# Patient Record
Sex: MALE | Race: WHITE | NOT HISPANIC OR LATINO | URBAN - METROPOLITAN AREA
[De-identification: names, ages, dates, MRNs, and addresses within clinical notes are randomized per-mention and may not be internally consistent; named-entity substitution may affect disease eponyms.]

---

## 2020-10-16 ENCOUNTER — OFFICE VISIT (OUTPATIENT)
Dept: INTERNAL MEDICINE | Facility: CLINIC | Age: 20
End: 2020-10-16

## 2020-10-16 ENCOUNTER — LAB (OUTPATIENT)
Dept: LAB | Facility: HOSPITAL | Age: 20
End: 2020-10-16

## 2020-10-16 VITALS
RESPIRATION RATE: 16 BRPM | WEIGHT: 223 LBS | DIASTOLIC BLOOD PRESSURE: 68 MMHG | HEART RATE: 63 BPM | OXYGEN SATURATION: 99 % | HEIGHT: 73 IN | SYSTOLIC BLOOD PRESSURE: 110 MMHG | BODY MASS INDEX: 29.55 KG/M2

## 2020-10-16 DIAGNOSIS — Z00.00 ANNUAL PHYSICAL EXAM: Primary | ICD-10-CM

## 2020-10-16 DIAGNOSIS — R30.0 DYSURIA: ICD-10-CM

## 2020-10-16 DIAGNOSIS — F31.9 BIPOLAR 1 DISORDER (HCC): ICD-10-CM

## 2020-10-16 LAB
ALBUMIN SERPL-MCNC: 4.6 G/DL (ref 3.5–5.2)
ALBUMIN/GLOB SERPL: 2.2 G/DL
ALP SERPL-CCNC: 70 U/L (ref 39–117)
ALT SERPL W P-5'-P-CCNC: 42 U/L (ref 1–41)
ANION GAP SERPL CALCULATED.3IONS-SCNC: 7.4 MMOL/L (ref 5–15)
AST SERPL-CCNC: 32 U/L (ref 1–40)
BASOPHILS # BLD AUTO: 0.08 10*3/MM3 (ref 0–0.2)
BASOPHILS NFR BLD AUTO: 0.9 % (ref 0–1.5)
BILIRUB BLD-MCNC: NEGATIVE MG/DL
BILIRUB SERPL-MCNC: 0.2 MG/DL (ref 0–1.2)
BUN SERPL-MCNC: 12 MG/DL (ref 6–20)
BUN/CREAT SERPL: 14.3 (ref 7–25)
CALCIUM SPEC-SCNC: 9.5 MG/DL (ref 8.6–10.5)
CHLORIDE SERPL-SCNC: 103 MMOL/L (ref 98–107)
CHOLEST SERPL-MCNC: 133 MG/DL (ref 0–200)
CLARITY, POC: CLEAR
CO2 SERPL-SCNC: 27.6 MMOL/L (ref 22–29)
COLOR UR: YELLOW
CREAT SERPL-MCNC: 0.84 MG/DL (ref 0.76–1.27)
DEPRECATED RDW RBC AUTO: 38.6 FL (ref 37–54)
EOSINOPHIL # BLD AUTO: 0.26 10*3/MM3 (ref 0–0.4)
EOSINOPHIL NFR BLD AUTO: 2.8 % (ref 0.3–6.2)
ERYTHROCYTE [DISTWIDTH] IN BLOOD BY AUTOMATED COUNT: 12.1 % (ref 12.3–15.4)
GFR SERPL CREATININE-BSD FRML MDRD: 114 ML/MIN/1.73
GLOBULIN UR ELPH-MCNC: 2.1 GM/DL
GLUCOSE SERPL-MCNC: 86 MG/DL (ref 65–99)
GLUCOSE UR STRIP-MCNC: NEGATIVE MG/DL
HBA1C MFR BLD: 5.4 % (ref 4.8–5.6)
HCT VFR BLD AUTO: 44.8 % (ref 37.5–51)
HDLC SERPL-MCNC: 38 MG/DL (ref 40–60)
HGB BLD-MCNC: 15.4 G/DL (ref 13–17.7)
IMM GRANULOCYTES # BLD AUTO: 0.02 10*3/MM3 (ref 0–0.05)
IMM GRANULOCYTES NFR BLD AUTO: 0.2 % (ref 0–0.5)
KETONES UR QL: NEGATIVE
LDLC SERPL CALC-MCNC: 62 MG/DL (ref 0–100)
LDLC/HDLC SERPL: 1.46 {RATIO}
LEUKOCYTE EST, POC: NEGATIVE
LYMPHOCYTES # BLD AUTO: 2.58 10*3/MM3 (ref 0.7–3.1)
LYMPHOCYTES NFR BLD AUTO: 27.6 % (ref 19.6–45.3)
MCH RBC QN AUTO: 30.1 PG (ref 26.6–33)
MCHC RBC AUTO-ENTMCNC: 34.4 G/DL (ref 31.5–35.7)
MCV RBC AUTO: 87.5 FL (ref 79–97)
MONOCYTES # BLD AUTO: 0.82 10*3/MM3 (ref 0.1–0.9)
MONOCYTES NFR BLD AUTO: 8.8 % (ref 5–12)
NEUTROPHILS NFR BLD AUTO: 5.59 10*3/MM3 (ref 1.7–7)
NEUTROPHILS NFR BLD AUTO: 59.7 % (ref 42.7–76)
NITRITE UR-MCNC: NEGATIVE MG/ML
NRBC BLD AUTO-RTO: 0.1 /100 WBC (ref 0–0.2)
PH UR: 8 [PH] (ref 5–8)
PLATELET # BLD AUTO: 337 10*3/MM3 (ref 140–450)
PMV BLD AUTO: 10.4 FL (ref 6–12)
POTASSIUM SERPL-SCNC: 4.2 MMOL/L (ref 3.5–5.2)
PROT SERPL-MCNC: 6.7 G/DL (ref 6–8.5)
PROT UR STRIP-MCNC: NEGATIVE MG/DL
RBC # BLD AUTO: 5.12 10*6/MM3 (ref 4.14–5.8)
RBC # UR STRIP: ABNORMAL /UL
SODIUM SERPL-SCNC: 138 MMOL/L (ref 136–145)
SP GR UR: 1.01 (ref 1–1.03)
TRIGL SERPL-MCNC: 197 MG/DL (ref 0–150)
TSH SERPL DL<=0.05 MIU/L-ACNC: 1.3 UIU/ML (ref 0.27–4.2)
UROBILINOGEN UR QL: NORMAL
VLDLC SERPL-MCNC: 33 MG/DL (ref 5–40)
WBC # BLD AUTO: 9.35 10*3/MM3 (ref 3.4–10.8)

## 2020-10-16 PROCEDURE — 99213 OFFICE O/P EST LOW 20 MIN: CPT | Performed by: INTERNAL MEDICINE

## 2020-10-16 PROCEDURE — 82306 VITAMIN D 25 HYDROXY: CPT | Performed by: INTERNAL MEDICINE

## 2020-10-16 PROCEDURE — 80061 LIPID PANEL: CPT | Performed by: INTERNAL MEDICINE

## 2020-10-16 PROCEDURE — 85025 COMPLETE CBC W/AUTO DIFF WBC: CPT | Performed by: INTERNAL MEDICINE

## 2020-10-16 PROCEDURE — G0432 EIA HIV-1/HIV-2 SCREEN: HCPCS | Performed by: INTERNAL MEDICINE

## 2020-10-16 PROCEDURE — 80053 COMPREHEN METABOLIC PANEL: CPT | Performed by: INTERNAL MEDICINE

## 2020-10-16 PROCEDURE — 99385 PREV VISIT NEW AGE 18-39: CPT | Performed by: INTERNAL MEDICINE

## 2020-10-16 PROCEDURE — 83036 HEMOGLOBIN GLYCOSYLATED A1C: CPT | Performed by: INTERNAL MEDICINE

## 2020-10-16 PROCEDURE — 84443 ASSAY THYROID STIM HORMONE: CPT | Performed by: INTERNAL MEDICINE

## 2020-10-16 PROCEDURE — 86592 SYPHILIS TEST NON-TREP QUAL: CPT | Performed by: INTERNAL MEDICINE

## 2020-10-16 PROCEDURE — 81003 URINALYSIS AUTO W/O SCOPE: CPT | Performed by: INTERNAL MEDICINE

## 2020-10-16 PROCEDURE — 86803 HEPATITIS C AB TEST: CPT | Performed by: INTERNAL MEDICINE

## 2020-10-16 PROCEDURE — 82607 VITAMIN B-12: CPT | Performed by: INTERNAL MEDICINE

## 2020-10-16 RX ORDER — CYCLOBENZAPRINE HCL 10 MG
10 TABLET ORAL 3 TIMES DAILY PRN
COMMUNITY
End: 2022-10-24

## 2020-10-16 RX ORDER — ACYCLOVIR 400 MG/1
400 TABLET ORAL
COMMUNITY
End: 2023-03-06

## 2020-10-16 RX ORDER — NAPROXEN 250 MG/1
250 TABLET ORAL 2 TIMES DAILY PRN
COMMUNITY
End: 2023-01-16

## 2020-10-16 NOTE — PROGRESS NOTES
Office Note      Date: 10/16/2020  Patient Name: Artur Dias  MRN: [id number]  : 2000    Chief Complaint   Patient presents with   • Urinary Tract Infection   • Establish Care       History of Present Illness: Artur Dias is a 20 y.o. male who presents for Urinary Tract Infection and Establish Care. He would like a checkup. 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 Hx of drug abuse, including IV drug use.     Has had dysuria for the past few months. Has seen urology as a teen for testicular cyst. No penile discharge. No fevers. No abd pain. Also has polyuria.     Hx of bipolar d/o on multiple meds but stopped on his own after he was 16. Feels well w/o any medications.     Has seen [facility] reece this year for DDD. Given yearlong rx for naproxen and also has some flexeril left over.     Subjective      Review of Systems:   Pertinent review of systems per HPI.    Review of Systems   Constitutional: Negative for activity change, appetite change, chills, diaphoresis and fatigue.   HENT: Negative for congestion, dental problem, drooling, ear discharge, ear pain, facial swelling and hearing loss.    Eyes: Negative for photophobia, pain, discharge, redness, itching and visual disturbance.   Respiratory: Negative for cough, choking, chest tightness and shortness of breath.    Cardiovascular: Negative for chest pain, palpitations and leg swelling.   Endocrine: Negative for cold intolerance, heat intolerance, polydipsia and polyuria.   Genitourinary: Positive for dysuria. Negative for difficulty urinating, flank pain, frequency, hematuria and testicular pain.   Musculoskeletal: Positive for back pain. Negative for arthralgias.   Skin: Negative for color change, pallor, rash and wound.   Allergic/Immunologic: Negative for environmental allergies.   Neurological: Negative for dizziness, facial asymmetry, light-headedness and headaches.   Psychiatric/Behavioral: Negative.    All  "other systems reviewed and are negative.    No Known Allergies    Objective     Physical Exam:  Vital Signs:   Vitals:    10/16/20 1325   BP: 110/68   Pulse: 63   Resp: 16   SpO2: 99%   Weight: 101 kg (223 lb)   Height: 185.4 cm (73\")       Physical Exam  Vitals signs and nursing note reviewed.   Constitutional:       General: He is not in acute distress.     Appearance: He is well-developed.   HENT:      Head: Normocephalic and atraumatic.      Right Ear: External ear normal.      Left Ear: External ear normal.   Eyes:      Extraocular Movements: Extraocular movements intact.      Conjunctiva/sclera: Conjunctivae normal.      Pupils: Pupils are equal, round, and reactive to light.   Cardiovascular:      Rate and Rhythm: Normal rate and regular rhythm.      Heart sounds: Normal heart sounds. No murmur. No friction rub. No gallop.    Pulmonary:      Effort: Pulmonary effort is normal. No respiratory distress.      Breath sounds: Normal breath sounds. No wheezing or rales.   Abdominal:      General: Abdomen is flat. Bowel sounds are normal.      Palpations: Abdomen is soft.   Skin:     General: Skin is warm and dry.      Coloration: Skin is not pale.         Assessment / Plan      Assessment & Plan:    1. Annual physical exam  Counseled on:  Colonoscopy at 45-51 y/o  PNA vaccinations starting at age 65  shingrix at age 50  Td/Tdap q 10 years  Wear seatbelt when driving  Flu shot annually- refused flu shot today    - Comprehensive Metabolic Panel  - Lipid Panel  - CBC & Differential  - TSH Rfx On Abnormal To Free T4  - Vitamin B12  - Vitamin D 25 Hydroxy  - CBC Auto Differential    2. Dysuria  UA dipstick neg. Has some blood. Will check for STDs.   - POCT urinalysis dipstick, automated  - Hepatitis C Antibody  - HIV-1 / O / 2 Ag / Antibody 4th Generation  - RPR  - Hemoglobin A1c    3. Bipolar 1 disorder (CMS/HCC)    - Ambulatory Referral to Psychiatry      Kerry Simmons MD  10/16/2020     Please note that portions of " this note may have been completed with a voice recognition program. Efforts were made to edit the dictations, but occasionally words are mistranscribed.

## 2020-10-17 LAB
25(OH)D3 SERPL-MCNC: 30.1 NG/ML (ref 30–100)
HCV AB SER DONR QL: NORMAL
HIV1+2 AB SER QL: NORMAL
RPR SER QL: NORMAL
VIT B12 BLD-MCNC: 385 PG/ML (ref 211–946)

## 2022-09-19 ENCOUNTER — LAB (OUTPATIENT)
Dept: LAB | Facility: HOSPITAL | Age: 22
End: 2022-09-19

## 2022-09-19 ENCOUNTER — OFFICE VISIT (OUTPATIENT)
Dept: INTERNAL MEDICINE | Facility: CLINIC | Age: 22
End: 2022-09-19

## 2022-09-19 VITALS
BODY MASS INDEX: 27.83 KG/M2 | WEIGHT: 210 LBS | TEMPERATURE: 98.3 F | HEART RATE: 87 BPM | SYSTOLIC BLOOD PRESSURE: 130 MMHG | DIASTOLIC BLOOD PRESSURE: 80 MMHG | OXYGEN SATURATION: 98 % | HEIGHT: 73 IN | RESPIRATION RATE: 16 BRPM

## 2022-09-19 DIAGNOSIS — F31.9 BIPOLAR 1 DISORDER: ICD-10-CM

## 2022-09-19 DIAGNOSIS — M62.81 MUSCLE WEAKNESS: ICD-10-CM

## 2022-09-19 DIAGNOSIS — R53.83 FATIGUE, UNSPECIFIED TYPE: Primary | ICD-10-CM

## 2022-09-19 DIAGNOSIS — Z91.048 ALLERGY TO MOLD: ICD-10-CM

## 2022-09-19 PROCEDURE — 82607 VITAMIN B-12: CPT | Performed by: INTERNAL MEDICINE

## 2022-09-19 PROCEDURE — 99214 OFFICE O/P EST MOD 30 MIN: CPT | Performed by: INTERNAL MEDICINE

## 2022-09-19 PROCEDURE — 80050 GENERAL HEALTH PANEL: CPT | Performed by: INTERNAL MEDICINE

## 2022-09-19 RX ORDER — NICOTINE 14MG/24HR
1 PATCH, TRANSDERMAL 24 HOURS TRANSDERMAL DAILY
COMMUNITY
Start: 2022-09-08 | End: 2022-10-24

## 2022-09-19 RX ORDER — FLUTICASONE PROPIONATE 50 MCG
2 SPRAY, SUSPENSION (ML) NASAL DAILY
COMMUNITY
Start: 2022-09-08 | End: 2022-10-24

## 2022-09-20 LAB
ALBUMIN SERPL-MCNC: 4.8 G/DL (ref 3.5–5.2)
ALBUMIN/GLOB SERPL: 2.2 G/DL
ALP SERPL-CCNC: 71 U/L (ref 39–117)
ALT SERPL W P-5'-P-CCNC: 28 U/L (ref 1–41)
ANION GAP SERPL CALCULATED.3IONS-SCNC: 11.8 MMOL/L (ref 5–15)
AST SERPL-CCNC: 18 U/L (ref 1–40)
BILIRUB SERPL-MCNC: 0.2 MG/DL (ref 0–1.2)
BUN SERPL-MCNC: 10 MG/DL (ref 6–20)
BUN/CREAT SERPL: 12.7 (ref 7–25)
CALCIUM SPEC-SCNC: 10 MG/DL (ref 8.6–10.5)
CHLORIDE SERPL-SCNC: 101 MMOL/L (ref 98–107)
CO2 SERPL-SCNC: 28.2 MMOL/L (ref 22–29)
CREAT SERPL-MCNC: 0.79 MG/DL (ref 0.76–1.27)
DEPRECATED RDW RBC AUTO: 39.2 FL (ref 37–54)
EGFRCR SERPLBLD CKD-EPI 2021: 127.2 ML/MIN/1.73
ERYTHROCYTE [DISTWIDTH] IN BLOOD BY AUTOMATED COUNT: 12.5 % (ref 12.3–15.4)
GLOBULIN UR ELPH-MCNC: 2.2 GM/DL
GLUCOSE SERPL-MCNC: 79 MG/DL (ref 65–99)
HCT VFR BLD AUTO: 43.8 % (ref 37.5–51)
HGB BLD-MCNC: 15.1 G/DL (ref 13–17.7)
MCH RBC QN AUTO: 29.3 PG (ref 26.6–33)
MCHC RBC AUTO-ENTMCNC: 34.5 G/DL (ref 31.5–35.7)
MCV RBC AUTO: 84.9 FL (ref 79–97)
PLATELET # BLD AUTO: 313 10*3/MM3 (ref 140–450)
PMV BLD AUTO: 10.3 FL (ref 6–12)
POTASSIUM SERPL-SCNC: 4.2 MMOL/L (ref 3.5–5.2)
PROT SERPL-MCNC: 7 G/DL (ref 6–8.5)
RBC # BLD AUTO: 5.16 10*6/MM3 (ref 4.14–5.8)
SODIUM SERPL-SCNC: 141 MMOL/L (ref 136–145)
TSH SERPL DL<=0.05 MIU/L-ACNC: 1 UIU/ML (ref 0.27–4.2)
VIT B12 BLD-MCNC: 382 PG/ML (ref 211–946)
WBC NRBC COR # BLD: 13.21 10*3/MM3 (ref 3.4–10.8)

## 2022-09-21 NOTE — PROGRESS NOTES
Office Note      Date: 2022  Patient Name: Artur Dias  MRN: [id number]  : 2000    Chief Complaint   Patient presents with   • Fatigue   • Uncontrollable Muscle Weakness   • Sinusitis       History of Present Illness: Artur Dias is a 22 y.o. male who presents for Fatigue, Uncontrollable Muscle Weakness, and Sinusitis. Sx chronic. Has been seen in the ED for CP with neg work up. Has b/l LE weakness. Has sinus pressure. Would like allergy testing to mold. Hx of bipolar d/o but stopped taking meds on his own. Has some depression sx. Has stress from children.     Subjective      Review of Systems:   Pertinent review of systems per HPI.    Review of Systems   Constitutional: Positive for fatigue. Negative for activity change, appetite change, chills and diaphoresis.   HENT: Negative for congestion, dental problem, drooling, ear discharge, ear pain, facial swelling and hearing loss.    Eyes: Negative for photophobia, pain, discharge, redness, itching and visual disturbance.   Respiratory: Negative for cough, choking, chest tightness and shortness of breath.    Cardiovascular: Negative for chest pain, palpitations and leg swelling.   Endocrine: Negative for cold intolerance, heat intolerance, polydipsia and polyuria.   Genitourinary: Negative for difficulty urinating, dysuria, flank pain, frequency and hematuria.   Musculoskeletal: Negative for arthralgias and back pain.   Skin: Negative for color change, pallor, rash and wound.   Allergic/Immunologic: Negative for environmental allergies.   Neurological: Positive for weakness. Negative for dizziness, facial asymmetry, light-headedness and headaches.   Psychiatric/Behavioral: Negative.    All other systems reviewed and are negative.    No Known Allergies    Objective     Physical Exam:  Vital Signs:   Vitals:    22 1539   BP: 130/80   Pulse: 87   Resp: 16   Temp: 98.3 °F (36.8 °C)   TempSrc: Temporal   SpO2: 98%   Weight: 95.3 kg (210 lb)  "  Height: 185.4 cm (73\")      Body mass index is 27.71 kg/m².    Physical Exam  Vitals and nursing note reviewed.   Constitutional:       General: He is not in acute distress.     Appearance: He is well-developed.   HENT:      Head: Normocephalic and atraumatic.      Right Ear: External ear normal.      Left Ear: External ear normal.   Eyes:      General: No scleral icterus.        Right eye: No discharge.         Left eye: No discharge.      Conjunctiva/sclera: Conjunctivae normal.   Cardiovascular:      Rate and Rhythm: Normal rate and regular rhythm.      Heart sounds: Normal heart sounds. No murmur heard.    No friction rub. No gallop.   Pulmonary:      Effort: Pulmonary effort is normal. No respiratory distress.      Breath sounds: Normal breath sounds. No wheezing or rales.   Skin:     General: Skin is warm and dry.      Coloration: Skin is not pale.         Assessment / Plan      Assessment & Plan:    1. Fatigue, unspecified type    - CBC (No Diff)  - Comprehensive Metabolic Panel  - TSH Rfx On Abnormal To Free T4  - Vitamin B12    2. Bipolar 1 disorder (HCC)    - Ambulatory Referral to Psychiatry    3. Allergy to mold    - Ambulatory Referral to Allergy    4. Muscle weakness    - Ambulatory Referral to Physical Therapy    Check labs above, if normal advised to f/u with psych for tx of underlying mood d/o as this could be cause of all his sx.   Ref to allergy for mold testing and PT for physical strenthening    Kerry Guzman MD  09/19/2022   Answers for HPI/ROS submitted by the patient on 9/15/2022  What is the primary reason for your visit?: Other  Please describe your symptoms.: Muscle weakness, Fatigue, Weight loss, joint pain  Have you had these symptoms before?: Yes  How long have you been having these symptoms?: 1-2 weeks      "

## 2022-10-05 ENCOUNTER — TREATMENT (OUTPATIENT)
Dept: PHYSICAL THERAPY | Facility: CLINIC | Age: 22
End: 2022-10-05

## 2022-10-05 DIAGNOSIS — R29.898 BILATERAL ARM WEAKNESS: Primary | ICD-10-CM

## 2022-10-05 PROCEDURE — 97162 PT EVAL MOD COMPLEX 30 MIN: CPT | Performed by: PHYSICAL THERAPIST

## 2022-10-05 PROCEDURE — 97110 THERAPEUTIC EXERCISES: CPT | Performed by: PHYSICAL THERAPIST

## 2022-10-05 NOTE — PROGRESS NOTES
Physical Therapy Initial Evaluation and Plan of Care      Patient: Artur Dias   : 2000  Diagnosis/ICD-10 Code:  The encounter diagnosis was Bilateral arm weakness.   Referring practitioner: Kerry Guzman MD  Date of Initial Visit: Type: THERAPY  Noted: 10/5/2022  Today's Date: 10/5/2022  Patient seen for 1 sessions         Visit Diagnoses:    ICD-10-CM ICD-9-CM   1. Bilateral arm weakness  R29.898 729.89       Subjective Questionnaire: QuickDASH: 27.27    Subjective Evaluation    History of Present Illness  Onset date: hands chronic for about 4 years.  Mechanism of injury: Symptoms of weakness began about 1 month ago.  Pt says he thinks he may have had a virus because many of his previous symptoms have resolved.  His arms are not as bad as they were.  He reports a 3-4 year history of B hand symptoms.  He wakes with tingling, numbness in first four fingers of R hand more than L.  It feels like fingers are locked up in the mornings. 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  Worked at [employer] until 4 months ago when he began work at [employer].     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 Pain  Current pain ratin  At best pain ratin  At worst pain ratin  Location: B hands, first 4 digits  Quality: sharp and cramping (numbness, tingling)  Alleviating factors: goes away on its own or with movement.  Exacerbated by: fine motor activities, bolts, etc.  Progression: worsening    Hand dominance: right    Diagnostic Tests  No diagnostic tests performed    Treatments  No previous or current treatments  Patient Goals  Patient goals for therapy: decreased pain           Treatment  Exercise 1  Exercise Name 1: HEP  education and practice         Functional Testing  Functional Tests Options: Quick DASH   Objective          Postural Observations    Additional Postural Observation Details  FHP, protracted B scapulae.      Palpation     Additional Palpation Details  Tenderness and tightness in R thenar eminence.    Neurological Testing     Sensation   Cervical/Thoracic   Left   Intact: light touch    Right   Intact: light touch    Additional Neurological Details  Intermittent numbness/tingling in first 4 fingers of R hand more than left.    Active Range of Motion     Additional Active Range of Motion Details  No change in hand/UE symptoms with CROM with OP.  CROM grossly WNL's.    Strength/Myotome Testing   Cervical Spine     Left   Normal strength    Right   Normal strength    Left Wrist/Hand      (2nd hand position)     Trial 1: 120 lbs    Trial 2: 120 lbs    Trial 3: 110 lbs    Average: 116.67 lbs    Right Wrist/Hand      (2nd hand position)     Trial 1: 119 lbs    Trial 2: 101 lbs    Trial 3: 130 lbs    Average: 116.67 lbs    Tests   Cervical     Left   Negative active compression ([place]).     Left Wrist/Hand   Positive Phalen's sign.   Negative Tinel's sign (medial nerve).     Right Wrist/Hand   Positive Phalen's sign and Tinel's sign (medial nerve).           Assessment & Plan     Assessment  Impairments: lacks appropriate home exercise program and pain with function  Functional Limitations: uncomfortable because of pain  Assessment details: Clinical presentation suggests R CTS more pronounced than L.  Pt will benefit from PT intervention to address pain and noted deficits and to educate in preventive/management strategies.    Barriers to therapy:   Prognosis: good    Goals  Plan Goals: 4 weeks  1)Pt. demonstrates independence and compliance with initial HEP.  2)Pt. reports reduction in pain intensity to no worse than 3/10 on NPRS.    6 weeks  1)Pt. demonstrates independence in advanced HEP for  ongoing improvement/symptom management.  2)Pt reports reduction in symptom frequency and intensity, and relief with performance of prescribed exercises.  3)Functional outcome measure is improved to <20 points, indicating improving functional abilities.          Plan  Therapy options: will be seen for skilled therapy services  Planned modality interventions: thermotherapy (hydrocollator packs) and cryotherapy  Planned therapy interventions: manual therapy, postural training, soft tissue mobilization, strengthening, stretching, therapeutic activities, joint mobilization, home exercise program, functional ROM exercises, flexibility and body mechanics training  Frequency: 1x week  Duration in weeks: 6  Treatment plan discussed with: patient  Plan details: PT weekly for 6 weeks, addressing pain and noted functional deficits.        Timed:  Manual Therapy:         mins  14992;  Therapeutic Exercise:    10     mins  80890;     Neuromuscular Charisma:        mins  28062;    Therapeutic Activity:          mins  70814;     Gait Training:           mins  49986;     Ultrasound:          mins  24516;    Electrical Stimulation:         mins  07364 ( );  Iontophoresis  ___ mins   96891    Untimed:  Electrical Stimulation:         mins  46732 ( );  Mechanical Traction:         mins  00647;     Timed Treatment:   10   mins   Total Treatment:     35   mins    PT SIGNATURE: Rebeca Bonilla PT   Electronically signed  DATE TREATMENT INITIATED: 10/5/2022    Initial Certification  Certification Period: 10/5/7965xokv5/2/2023  I certify that the therapy services are furnished while this patient is under my care.  The services outlined above are required by this patient, and will be reviewed every 90 days.    Physician Signature:_____________________________________________             PHYSICIAN: Kerry Guzman MD  NPI: [id number]                                      DATE:       Please sign and return via fax to  [phone].. Thank you, [facility].

## 2022-10-10 ENCOUNTER — TREATMENT (OUTPATIENT)
Dept: PHYSICAL THERAPY | Facility: CLINIC | Age: 22
End: 2022-10-10

## 2022-10-10 DIAGNOSIS — R29.898 BILATERAL ARM WEAKNESS: Primary | ICD-10-CM

## 2022-10-10 PROCEDURE — 97110 THERAPEUTIC EXERCISES: CPT | Performed by: PHYSICAL THERAPIST

## 2022-10-10 PROCEDURE — 97140 MANUAL THERAPY 1/> REGIONS: CPT | Performed by: PHYSICAL THERAPIST

## 2022-10-10 NOTE — PROGRESS NOTES
Physical Therapy Treatment Note  VISIT: 2          Subjective    Artur Dias reports: he woke up with less intense symptoms this morning.  He has done his home exercises, but he says not consistently.  He indicates tingling in fingertips of R 1-3 digits, but not in thumb or palm today.      Pre-treatment pain:  5  Post-treatment pain:  4      Objective    Treatment    Exercise 1  Exercise Name 1: R hand tendon glides  Exercise 2  Exercise Name 2: R forearm stretches-flexors and extensors  Exercise 3  Exercise Name 3: radial nerve glides  Exercise 4  Exercise Name 4: median nerve glides  Exercise 5  Exercise Name 5: chin tucks  Exercise 6  Exercise Name 6: cervicothoracic extension-seated  Exercise 7  Exercise Name 7: fist traction    Manual Rx 1  Manual Rx 1 Location: R wrist and hand  Manual Rx 1 Type: Astym, STM, PROM, and joint mobilization to improve R wrist and hand flexibility    Other Treatment Provided  MH Applied: Yes  MH Prior to Rx: Yes       Assessment & Plan     Assessment    Assessment details: Symptom presentation today suggests cervical component of R UE symptoms.  Pt noted awareness of L hand symptoms while working only with R hand in clinic today.  Cervical flexion reduced symptoms while extension seemed to increase symptoms.    Plan  Plan details: Continue PT.  Progress or modify exercises based on symptom response.               Timed:  Manual Therapy:    15     mins  95716;  Therapeutic Exercise:    25     mins  38357;     Neuromuscular Charisma:        mins  22816;    Therapeutic Activity:          mins  35000;     Gait Training:           mins  79556;     Ultrasound:          mins  87119;    Electrical Stimulation:         mins  73521 ( );    Untimed:  Electrical Stimulation:         mins  19834 ( );  Mechanical Traction:         mins  62757;  Canalith Repositioning       mins  07950    Timed Treatment:   40   mins   Total Treatment:     50   mins      Rebeca EPSTEIN  Chad, PT  Physical Therapist

## 2022-10-17 ENCOUNTER — TELEPHONE (OUTPATIENT)
Dept: PHYSICAL THERAPY | Facility: CLINIC | Age: 22
End: 2022-10-17

## 2022-10-24 ENCOUNTER — TELEMEDICINE (OUTPATIENT)
Dept: PSYCHIATRY | Facility: CLINIC | Age: 22
End: 2022-10-24

## 2022-10-24 DIAGNOSIS — F41.1 GENERALIZED ANXIETY DISORDER: ICD-10-CM

## 2022-10-24 DIAGNOSIS — F31.89 OTHER BIPOLAR DISORDER: Primary | ICD-10-CM

## 2022-10-24 DIAGNOSIS — F40.10 SOCIAL ANXIETY DISORDER: ICD-10-CM

## 2022-10-24 PROCEDURE — 90792 PSYCH DIAG EVAL W/MED SRVCS: CPT

## 2022-10-24 RX ORDER — BUSPIRONE HYDROCHLORIDE 10 MG/1
10 TABLET ORAL 3 TIMES DAILY
Qty: 90 TABLET | Refills: 0 | Status: SHIPPED | OUTPATIENT
Start: 2022-10-24 | End: 2022-11-21 | Stop reason: SDUPTHER

## 2022-10-24 RX ORDER — PROPRANOLOL HYDROCHLORIDE 10 MG/1
10 TABLET ORAL 2 TIMES DAILY PRN
Qty: 60 TABLET | Refills: 0 | Status: SHIPPED | OUTPATIENT
Start: 2022-10-24 | End: 2022-11-21

## 2022-10-24 RX ORDER — LAMOTRIGINE 25 MG/1
TABLET ORAL
Qty: 46 TABLET | Refills: 0 | Status: SHIPPED | OUTPATIENT
Start: 2022-10-24 | End: 2022-11-21

## 2022-10-24 NOTE — PROGRESS NOTES
This provider is located at [place], [place]. The Patient is seen remotely using Video. Patient is being seen via telehealth and confirm that they are in a secure environment for this session. Patient is located in [place], [place] at his home. The patient's condition being diagnosed/treated is appropriate for telemedicine. Provider identified as Michael Nicholas as well as credentials APRN MSN PMHNP-BC.   The client/patient gave consent to be seen remotely, and when consent is given they understand that the consent allows for patient identifiable information to be sent to a third party as needed.  They may refuse to be seen remotely at any time. The electronic data is encrypted and password protected, and the patient has been advised of the potential risks to privacy not withstanding such measures.    Subjective     Artur Dias is a 22 y.o. male who presents today for initial evaluation     Chief Complaint: Bipolar disorder and anxiety    History of Present Illness: This is the first encounter for this APRN with the patient.  Patient is a referral from his primary care physician for evaluation and management of bipolar disorder and anxiety.  Patient reports he was diagnosed with bipolar disorder in the past.  He states he first started treatment at age 12.  States at that time he was having a lot of anger, irritability, depression, and anxiety.  He states he has had 2 inpatient admissions at age 13.  Did have 1 overdose attempt of taking muscle relaxers.  He states he has been on and off medications through his life.  States he is not been on any medications for the past 5+ years.  He states he would like to get his symptoms more under control.  States he has children now and that is also giving him problems in his relationship.  He states he does experience episodes of depression.  He describes these as loss of interest in doing things that he enjoys, and lack of motivation.  He states he is a hard worker at his  job, but when it comes to things at home he just has no motivation to get those done.  He currently rates his depression a 5 on a 1-10 scale with 10 being the worst.  States sleep is poor.  Appetite is good.  He denies any hopelessness.  Denies any suicidal or homicidal ideation.  States he does have a history of cutting, but has not done that for years.  Patient was asked about any history of any hypomanic type symptoms.  He states he does go for about a day at a time couple times a month where he has increased energy, is more productive, thoughts will race, and is more impulsive.  He states he cannot recall them ever lasting more than a day.  Also states he has a lot of irritability during these times.  He states the simplest thing will make him angry.  He denies any history of auditory or visual hallucinations.  Denies any paranoia.  States he also suffers from anxiety.  Rates his anxiety as 7 on a 1-10 scale with 10 being the worst.  States he is a worrier and over thinker.  He states when he was in a juvenile longterm center for 1-1/2 years that he was on Prozac and Lexapro.  He states during those times he was more irritable and angry.  Discussed with patient that those could contribute to any hypomania that he was experiencing.  He states he has had panic attacks in the past, but none in the last 2 months.  Also has a lot of social anxiety.  He states his fiancée often wants him to go to places with her, but he cannot due to his anxiety.  He describes that he has increased heart rate, shortness of breath, shaky voice, and tension in the situations.  He states crowds also cause these issues.  He states when he worked at [employer] he was a  and had to address his team, and he always felt the symptoms of anxiety when doing so.    The following portions of the patient's history were reviewed and updated as appropriate: allergies, current medications, past family history, past medical history, past  social history, past surgical history and problem list.    Past Psychiatric History: See HPI for treatment history.    Family Psychiatric History: Mother and maternal uncle have drug abuse issues.  He does not know his biological father, so details of that side of the family is not known.  No suicides among first-degree relatives.    Substance Use History: Patient stated that he started using pills and cocaine at age 10.  He smoked marijuana from age [age].  Also has used cocaine, crack, and methamphetamine in the past.  States he has not used any of those drugs since May 30, 2018.  States he drinks alcohol occasionally.    Past Medical History:  Past Medical History:   Diagnosis Date   • Arthritis    • Bipolar 1 disorder (HCC)    • DDD (degenerative disc disease), lumbar    • HSV (herpes simplex virus) anogenital infection        Social History: Patient was born and raised in [place].  He has lived in [place] for about the last 5-1/2 years for a better opportunity for his future.  He states there were a lot of drugs where he grew up from anyone to get away from that.  He was raised by his mother to age 12.  At age 12 he was living with his grandfather's ex-wife.  He states she left and he basically raised himself from age [age].  States he lived with friends and bounced from house to house.  He has a GED.  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  He has been engaged for the past 3 years.  They plan to get  next year.  He has a 8-year-old daughter that still lives in [place] with her mother, and a 2-year-old son with his current fiancé.  He was in a juvenile facility for 1-1/2 years.  Denies any current legal issues.  Hobbies include video games and fishing.  Social History     Socioeconomic History   • Marital status: Single   Tobacco Use   • Smoking status: Every Day     Packs/day: 1.50     Types: Cigarettes   •  Smokeless tobacco: Never   Substance and Sexual Activity   • Alcohol use: Yes     Comment: rarely   • Drug use: Never   • Sexual activity: Defer       Family History:  Family History   Problem Relation Age of Onset   • Liver disease Mother    • Mental illness Mother    • Diabetes Maternal Grandfather        Past Surgical History:  Past Surgical History:   Procedure Laterality Date   • CHOLECYSTECTOMY  2019       Problem List:  Patient Active Problem List   Diagnosis   • Other bipolar disorder (HCC)   • Generalized anxiety disorder       Allergy:   No Known Allergies     Current Medications:   Current Outpatient Medications   Medication Sig Dispense Refill   • acyclovir (ZOVIRAX) 400 MG tablet Take 400 mg by mouth Every 4 (Four) Hours While Awake. Take no more than 5 doses a day.     • busPIRone (BUSPAR) 10 MG tablet Take 1 tablet by mouth 3 (Three) Times a Day. 90 tablet 0   • lamoTRIgine (LaMICtal) 25 MG tablet Take 1 tablet by mouth Daily for 14 days, THEN 2 tablets Daily for 16 days. 46 tablet 0   • naproxen (NAPROSYN) 250 MG tablet Take 250 mg by mouth 2 (Two) Times a Day As Needed.     • propranolol (INDERAL) 10 MG tablet Take 1 tablet by mouth 2 (Two) Times a Day As Needed (anxiety). 60 tablet 0     No current facility-administered medications for this visit.       Review of Symptoms:    Review of Systems   Constitutional: Negative.    Musculoskeletal: Positive for back pain.        DDD   Psychiatric/Behavioral: Positive for sleep disturbance and depressed mood. The patient is nervous/anxious.          Physical Exam:   There were no vitals taken for this visit.    Appearance: Normal  Gait, Station, Strength: Within normal limits    Mental Status Exam:   Hygiene:   good  Cooperation:  Cooperative  Eye Contact:  Good  Psychomotor Behavior:  Appropriate  Affect:  Full range  Mood: anxious  Hopelessness: Denies  Speech:  Normal  Thought Process:  Goal directed  Thought Content:  Normal  Suicidal:  None  Homicidal:   None  Hallucinations:  None  Delusion:  None  Memory:  Intact  Orientation:  Person, Place, Time and Situation  Reliability:  good  Insight:  Good  Judgement:  Good  Impulse Control:  Good    PHQ-9 Depression Screening  Little interest or pleasure in doing things?     Feeling down, depressed, or hopeless?     Trouble falling or staying asleep, or sleeping too much?     Feeling tired or having little energy?     Poor appetite or overeating?     Feeling bad about yourself - or that you are a failure or have let yourself or your family down?     Trouble concentrating on things, such as reading the newspaper or watching television?     Moving or speaking so slowly that other people could have noticed? Or the opposite - being so fidgety or restless that you have been moving around a lot more than usual?     Thoughts that you would be better off dead, or of hurting yourself in some way?     PHQ-9 Total Score     If you checked off any problems, how difficult have these problems made it for you to do your work, take care of things at home, or get along with other people?          PHQ-9 Total Score:       DORITA 7 anxiety screening tool that patient filled out virtually reviewed by this APRN at today's encounter.    PROMIS scale screening tool that patient filled out virtually reviewed by this APRN at today's encounter.    [facility] request number [id number] reviewed by this APRN at today's encounter.    Previous Provider notes and available records reviewed by this APRN today.     Lab Results:   Office Visit on 09/19/2022   Component Date Value Ref Range Status   • WBC 09/19/2022 13.21 (H)  3.40 - 10.80 10*3/mm3 Final   • RBC 09/19/2022 5.16  4.14 - 5.80 10*6/mm3 Final   • Hemoglobin 09/19/2022 15.1  13.0 - 17.7 g/dL Final   • Hematocrit 09/19/2022 43.8  37.5 - 51.0 % Final   • MCV 09/19/2022 84.9  79.0 - 97.0 fL Final   • MCH 09/19/2022 29.3  26.6 - 33.0 pg Final   • MCHC 09/19/2022 34.5  31.5 - 35.7 g/dL Final   • RDW  09/19/2022 12.5  12.3 - 15.4 % Final   • RDW-SD 09/19/2022 39.2  37.0 - 54.0 fl Final   • MPV 09/19/2022 10.3  6.0 - 12.0 fL Final   • Platelets 09/19/2022 313  140 - 450 10*3/mm3 Final   • Glucose 09/19/2022 79  65 - 99 mg/dL Final   • BUN 09/19/2022 10  6 - 20 mg/dL Final   • Creatinine 09/19/2022 0.79  0.76 - 1.27 mg/dL Final   • Sodium 09/19/2022 141  136 - 145 mmol/L Final   • Potassium 09/19/2022 4.2  3.5 - 5.2 mmol/L Final   • Chloride 09/19/2022 101  98 - 107 mmol/L Final   • CO2 09/19/2022 28.2  22.0 - 29.0 mmol/L Final   • Calcium 09/19/2022 10.0  8.6 - 10.5 mg/dL Final   • Total Protein 09/19/2022 7.0  6.0 - 8.5 g/dL Final   • Albumin 09/19/2022 4.80  3.50 - 5.20 g/dL Final   • ALT (SGPT) 09/19/2022 28  1 - 41 U/L Final   • AST (SGOT) 09/19/2022 18  1 - 40 U/L Final   • Alkaline Phosphatase 09/19/2022 71  39 - 117 U/L Final   • Total Bilirubin 09/19/2022 0.2  0.0 - 1.2 mg/dL Final   • Globulin 09/19/2022 2.2  gm/dL Final   • A/G Ratio 09/19/2022 2.2  g/dL Final   • BUN/Creatinine Ratio 09/19/2022 12.7  7.0 - 25.0 Final   • Anion Gap 09/19/2022 11.8  5.0 - 15.0 mmol/L Final   • eGFR 09/19/2022 127.2  >60.0 mL/min/1.73 Final    National Kidney Foundation and American Society of Nephrology (ASN) Task Force recommended calculation based on the Chronic Kidney Disease Epidemiology Collaboration (CKD-EPI) equation refit without adjustment for race.   • TSH 09/19/2022 1.000  0.270 - 4.200 uIU/mL Final   • Vitamin B-12 09/19/2022 382  211 - 946 pg/mL Final       Assessment & Plan   Problems Addressed this Visit        Mental Health    Other bipolar disorder (HCC) - Primary    Relevant Medications    lamoTRIgine (LaMICtal) 25 MG tablet    busPIRone (BUSPAR) 10 MG tablet    Generalized anxiety disorder    Relevant Medications    propranolol (INDERAL) 10 MG tablet    busPIRone (BUSPAR) 10 MG tablet   Diagnoses       Codes Comments    Other bipolar disorder (HCC)    -  Primary ICD-10-CM: F31.89  ICD-9-CM: 296.89      Generalized anxiety disorder     ICD-10-CM: F41.1  ICD-9-CM: 300.02           Visit Diagnoses:    ICD-10-CM ICD-9-CM   1. Other bipolar disorder (HCC)  F31.89 296.89   2. Generalized anxiety disorder  F41.1 300.02     Discussed treatment options with patient.  Discussed with patient the impression that this APRN gets is that he has other bipolar disorder and generalized anxiety disorder along with social anxiety disorder.  Discussed with patient that the best option for treatment for his bipolar disorder is a mood stabilizer.  Discussed Lamictal with patient.  Discussed with patient that this is a seizure medication, but is also helpful in stabilizing the mood.  Discussed with patient that there is a chance for a rash related to Pride-Oscar syndrome that he should watch for with Lamictal.  Discussed with him that this could become an emergent situation, so he should let this APRN know and stop the medication immediately if that were to happen.  Patient verbalizes understanding and states he would like to try the medication.  Start Lamictal 25 mg daily for 2 weeks, then increase to 50 mg daily for 2 weeks for bipolar disorder.  Discussed with patient that at next visit we will anticipate increasing to 100 mg daily.  Discussed with patient that we will avoid SSRIs as they can contribute to kait in bipolar patients.  Discussed BuSpar with patient.  Patient states he is never taking the medication.  Discussed there is a chance of kait with BuSpar, so he should be aware and watch for those symptoms.  Patient verbalized understanding and states he would like to try the medication.  Start BuSpar 10 mg 3 times daily for anxiety.  Discussed propanolol with patient for his social anxiety.  Discussed with patient this is a blood pressure medication.  Discussed with patient that it blocks the fight-or-flight response of the body and will help with physical symptoms of anxiety that he describes.  Patient verbalized  understanding and states he would like to try the medication.  Did encourage the patient to do a test run of the medication before relying on it.  Start propanolol 10 mg twice daily as needed for anxiety.  We will see patient again in 4 weeks to reassess.  Encouraged patient to call if he had any issues sooner.  TREATMENT PLAN/GOALS: Continue supportive psychotherapy efforts and medications as indicated. Treatment and medication options discussed during today's visit. Patient acknowledged and verbally consented to continue with current treatment plan and was educated on the importance of compliance with treatment and follow-up appointments.    Short Term Goals: Patient will be compliant with medication, and patient will have no significant medication related side effects.  Patient will be engaged in psychotherapy as indicated.  Patient will report subjective improvement of symptoms.    Long term goals: To stabilize mood and treat/improve subjective symptoms, the patient will stay out of the hospital, the patient will be at an optimal level of functioning, and the patient will take all medications as prescribed.  The patient verbalized understanding and agreement with goals that were mutually set.    MEDICATION ISSUES:    Discussed medication options and treatment plan of prescribed medication as well as the risks, benefits, and side effects including potential falls, possible impaired driving and metabolic adversities among others. Patient is agreeable to call the office with any worsening of symptoms or onset of side effects. Patient is agreeable to call 911 or go to the nearest ER should he/she begin having SI/HI. If patient has any concerns or needs assistance they were instructed to call the [facility] at [phone].    MEDS ORDERED DURING VISIT:  New Medications Ordered This Visit   Medications   • lamoTRIgine (LaMICtal) 25 MG tablet     Sig: Take 1 tablet by mouth Daily for 14  days, THEN 2 tablets Daily for 16 days.     Dispense:  46 tablet     Refill:  0   • propranolol (INDERAL) 10 MG tablet     Sig: Take 1 tablet by mouth 2 (Two) Times a Day As Needed (anxiety).     Dispense:  60 tablet     Refill:  0   • busPIRone (BUSPAR) 10 MG tablet     Sig: Take 1 tablet by mouth 3 (Three) Times a Day.     Dispense:  90 tablet     Refill:  0       Return in about 4 weeks (around 11/21/2022) for Video visit.             This document has been electronically signed by SHAVONNE Gillis  October 24, 2022 17:01 EDT    Part of this note may be an electronic transmission of spoken language to printed text using the Dragon Dictation System.

## 2022-11-10 ENCOUNTER — DOCUMENTATION (OUTPATIENT)
Dept: PHYSICAL THERAPY | Facility: CLINIC | Age: 22
End: 2022-11-10

## 2022-11-10 NOTE — PROGRESS NOTES
Discharge Summary  Discharge Summary from Physical Therapy Report      Patient: Artur Dias   : 2000  Diagnosis/ICD-10 Code:  There were no encounter diagnoses.   Referring practitioner: No ref. provider found  Date of Initial Visit: No linked episodes  Today's Date: 11/10/2022  Date of Last Visit: 10-     Number of Visits: 2    Discharge Status of Patient: 3 consecutive no shows  Goals: unknown    Discharge Plan: Patient to return to referring/providing physician        Date of Discharge: 11/10/2022        Rebeca Bonilla, PT

## 2022-11-21 ENCOUNTER — TELEMEDICINE (OUTPATIENT)
Dept: PSYCHIATRY | Facility: CLINIC | Age: 22
End: 2022-11-21

## 2022-11-21 DIAGNOSIS — F40.10 SOCIAL ANXIETY DISORDER: ICD-10-CM

## 2022-11-21 DIAGNOSIS — F41.1 GENERALIZED ANXIETY DISORDER: Chronic | ICD-10-CM

## 2022-11-21 DIAGNOSIS — F31.89 OTHER BIPOLAR DISORDER: Primary | Chronic | ICD-10-CM

## 2022-11-21 PROCEDURE — 99214 OFFICE O/P EST MOD 30 MIN: CPT

## 2022-11-21 RX ORDER — ARIPIPRAZOLE 5 MG/1
5 TABLET ORAL DAILY
Qty: 30 TABLET | Refills: 0 | Status: SHIPPED | OUTPATIENT
Start: 2022-11-21 | End: 2022-12-16

## 2022-11-21 RX ORDER — HYDROXYZINE HYDROCHLORIDE 10 MG/1
10 TABLET, FILM COATED ORAL 3 TIMES DAILY PRN
Qty: 90 TABLET | Refills: 0 | Status: SHIPPED | OUTPATIENT
Start: 2022-11-21 | End: 2023-01-16 | Stop reason: SDUPTHER

## 2022-11-21 RX ORDER — BUSPIRONE HYDROCHLORIDE 10 MG/1
10 TABLET ORAL 3 TIMES DAILY
Qty: 90 TABLET | Refills: 0 | Status: SHIPPED | OUTPATIENT
Start: 2022-11-21 | End: 2022-12-19 | Stop reason: SDUPTHER

## 2022-11-21 NOTE — PROGRESS NOTES
This provider is located at [place], [place]. The Patient is seen remotely using Video. Patient is being seen via telehealth and confirm that they are in a secure environment for this session. Patient is located in [place], [place] at his home. The patient's condition being diagnosed/treated is appropriate for telemedicine. Provider identified as Michael Nicholas as well as credentials APRN MSN PMHNP-BC.   The client/patient gave consent to be seen remotely, and when consent is given they understand that the consent allows for patient identifiable information to be sent to a third party as needed.  They may refuse to be seen remotely at any time. The electronic data is encrypted and password protected, and the patient has been advised of the potential risks to privacy not withstanding such measures.    Chief Complaint  Manic Behavior, Depression, and Anxiety    Subjective        Artur Dias presents to [facility] for   History of Present Illness  Patient is seen today for follow-up visit for bipolar disorder anxiety.  Patient reports that he stopped taking the Lamictal and propanolol as he did not like the medications.  He states he did not like The Lamictal made him feel emotionally.  States he only took it about 3 days.  He states the propranolol made his heart feel weird and he did not like that weird feeling in his chest.  States he continues to have symptoms of his bipolar disorder and anxiety.  Currently rates his anxiety as 6 on a 1-10 scale with 10 being the worst.  States he has noticed that the BuSpar has helped with his amount of worry that he does.  Denies panic attacks.  Currently rates his depression a 5 on a 1-10 scale with 10 being the worst.  Denies hopelessness.  Continues to work.  Denies suicidal or homicidal ideation.  States he has had some symptoms of hypomania recently.  States he did get up and clean the whole house and carpets 1 weekend.  Denies any  risky type behaviors.  Denies any overspending or decisions with long-term consequences during that time.  States his sleep has been less with only getting about 4 hours per night.  He denies any paranoia.  Denies any auditory or visual hallucinations.  Objective   Vital Signs:   There were no vitals taken for this visit.      PHQ-9 Score:   PHQ-9 Total Score: (P) 10     Mental Status Exam:   Hygiene:   good  Cooperation:  Cooperative  Eye Contact:  Good  Psychomotor Behavior:  Appropriate  Affect:  Full range  Mood: depressed and anxious  Speech:  Normal  Thought Process:  Goal directed  Thought Content:  Normal  Suicidal:  None  Homicidal:  None  Hallucinations:  None  Delusion:  None  Memory:  Intact  Orientation:  Person, Place, Time and Situation  Reliability:  good  Insight:  Good  Judgement:  Good  Impulse Control:  Good  Physical/Medical Issues:  No      PHQ-9 Depression Screening  Little interest or pleasure in doing things? (P) 2   Feeling down, depressed, or hopeless? (P) 1   Trouble falling or staying asleep, or sleeping too much? (P) 1   Feeling tired or having little energy? (P) 2   Poor appetite or overeating? (P) 0   Feeling bad about yourself - or that you are a failure or have let yourself or your family down? (P) 1   Trouble concentrating on things, such as reading the newspaper or watching television? (P) 2   Moving or speaking so slowly that other people could have noticed? Or the opposite - being so fidgety or restless that you have been moving around a lot more than usual? (P) 1   Thoughts that you would be better off dead, or of hurting yourself in some way? (P) 0   PHQ-9 Total Score (P) 10   If you checked off any problems, how difficult have these problems made it for you to do your work, take care of things at home, or get along with other people? (P) Very difficult     PHQ-9 Total Score: (P) 10    DORITA 7 anxiety screening tool that patient filled out virtually reviewed by this APRN at  today's encounter.    PROMIS scale screening tool that patient filled out virtually reviewed by this APRN at today's encounter.    Previous Provider notes and available records reviewed by this APRN today.   Current Medications:   Current Outpatient Medications   Medication Sig Dispense Refill   • acyclovir (ZOVIRAX) 400 MG tablet Take 400 mg by mouth Every 4 (Four) Hours While Awake. Take no more than 5 doses a day.     • ARIPiprazole (Abilify) 5 MG tablet Take 1 tablet by mouth Daily. 30 tablet 0   • busPIRone (BUSPAR) 10 MG tablet Take 1 tablet by mouth 3 (Three) Times a Day. 90 tablet 0   • hydrOXYzine (ATARAX) 10 MG tablet Take 1 tablet by mouth 3 (Three) Times a Day As Needed for Anxiety. 90 tablet 0   • naproxen (NAPROSYN) 250 MG tablet Take 250 mg by mouth 2 (Two) Times a Day As Needed.       No current facility-administered medications for this visit.       Physical Exam   Result Review :    The following data was reviewed by: SHAVONNE Gillis on 11/21/2022:  Common labs    Common Labs 9/7/22 9/19/22 9/19/22     1606 1606   Glucose   79   BUN   10   Creatinine   0.79   Sodium   141   Potassium   4.2   Chloride   101   Calcium   10.0   Albumin   4.80   Total Bilirubin   0.2   Alkaline Phosphatase   71   AST (SGOT)   18   ALT (SGPT)   28   WBC 9.14 13.21 (A)    Hemoglobin 14.8 15.1    Hematocrit 41.9 43.8    Platelets 302 313    (A) Abnormal value               Assessment and Plan   Problem List Items Addressed This Visit        Mental Health    Other bipolar disorder (HCC) - Primary    Relevant Medications    busPIRone (BUSPAR) 10 MG tablet    ARIPiprazole (Abilify) 5 MG tablet    hydrOXYzine (ATARAX) 10 MG tablet    Generalized anxiety disorder    Relevant Medications    busPIRone (BUSPAR) 10 MG tablet    ARIPiprazole (Abilify) 5 MG tablet    hydrOXYzine (ATARAX) 10 MG tablet    Social anxiety disorder    Relevant Medications    busPIRone (BUSPAR) 10 MG tablet    ARIPiprazole (Abilify) 5 MG tablet     hydrOXYzine (ATARAX) 10 MG tablet     Issa treatment options with patient.  Discontinue propranolol and Lamictal as patient states he could not tolerate them.  Discussed Abilify with patient.  Discussed with patient that Abilify is used to treat bipolar disorder.  Discussed with patient that it can cause weight gain and metabolic syndrome.  Patient states he would like to try the medication.  Start Abilify 5 mg daily for bipolar disorder.  Continue BuSpar 10 mg 3 times daily for anxiety.  Discussed hydroxyzine with patient how it is good with anxiety.  Discussed with patient it is an antihistamine and could make him sleepy, so he should do a test run of the medication before relying on it.  Patient states he would like to try the medication.  Start hydroxyzine 10 mg 3 times daily as needed for anxiety.  We will see patient in 4 weeks to reassess.  Encouraged patient to call if he had any issues sooner.    TREATMENT PLAN/GOALS: Continue supportive psychotherapy efforts and medications as indicated. Treatment and medication options discussed during today's visit. Patient ackowledged and verbally consented to continue with current treatment plan and was educated on the importance of compliance with treatment and follow-up appointments.    DEPRESSION:  Patient screened positive for depression based on a PHQ-9 score of 0 on 9/19/2022. Follow-up recommendations include: Prescribed antidepressant medication treatment.       MEDICATION ISSUES:  We discussed risks, benefits, and side effects of the above medications and the patient was agreeable with the plan. Patient was educated on the importance of compliance with treatment and follow-up appointments.  Patient is agreeable to call the office with any worsening of symptoms or onset of side effects. Patient is agreeable to call 911 or go to the nearest ER should he/she begin having SI/HI.      Counseled patient regarding multimodal approach with healthy nutrition, healthy  sleep, regular physical activity, social activities, counseling, and medications.      Coping skills reviewed and encouraged positive framing of thoughts     Assisted patient in processing above session content; acknowledged and normalized patient’s thoughts, feelings, and concerns.  Applied  positive coping skills and behavior management in session.  Allowed patient to freely discuss issues without interruption or judgment. Provided safe, confidential environment to facilitate the development of positive therapeutic relationship and encourage open, honest communication. Assisted patient in identifying risk factors which would indicate the need for higher level of care including thoughts to harm self or others and/or self-harming behavior and encouraged patient to contact this office, call 911, or present to the nearest emergency room should any of these events occur. Discussed crisis intervention services and means to access. If patient has any concerns or needs assistance they were instructed to call the [facility] at [phone].    MEDS ORDERED DURING VISIT:  New Medications Ordered This Visit   Medications   • busPIRone (BUSPAR) 10 MG tablet     Sig: Take 1 tablet by mouth 3 (Three) Times a Day.     Dispense:  90 tablet     Refill:  0   • ARIPiprazole (Abilify) 5 MG tablet     Sig: Take 1 tablet by mouth Daily.     Dispense:  30 tablet     Refill:  0   • hydrOXYzine (ATARAX) 10 MG tablet     Sig: Take 1 tablet by mouth 3 (Three) Times a Day As Needed for Anxiety.     Dispense:  90 tablet     Refill:  0         Follow Up   Return in about 4 weeks (around 12/19/2022) for Video visit.    Patient was given instructions and counseling regarding his condition or for health maintenance advice. Please see specific information pulled into the AVS if appropriate.         This document has been electronically signed by SHAVONNE Gillis  November 21, 2022 16:24 EST    Part of this note may  be an electronic transcription/translation of spoken language to printed text using the Dragon Dictation System.

## 2022-12-14 ENCOUNTER — TELEPHONE (OUTPATIENT)
Dept: INTERNAL MEDICINE | Facility: CLINIC | Age: 22
End: 2022-12-14

## 2022-12-14 NOTE — TELEPHONE ENCOUNTER
Caller: Artur Dias    Relationship: Self    Best call back number: [phone]    What is the medical concern/diagnosis: LUMP ON TESTICLE    What specialty or service is being requested: UROLOGY        Any additional details:   PATIENT NEEDS A SECOND OPINION ON THIS LUMP SO PATIENT CAN PROCEED WITH SURGERY, PATIENT STARTING TO FEEL DISCOMFORT/PAIN.

## 2022-12-16 ENCOUNTER — LAB (OUTPATIENT)
Dept: LAB | Facility: HOSPITAL | Age: 22
End: 2022-12-16

## 2022-12-16 ENCOUNTER — OFFICE VISIT (OUTPATIENT)
Dept: INTERNAL MEDICINE | Facility: CLINIC | Age: 22
End: 2022-12-16

## 2022-12-16 VITALS
WEIGHT: 214 LBS | HEIGHT: 73 IN | SYSTOLIC BLOOD PRESSURE: 132 MMHG | TEMPERATURE: 97.3 F | BODY MASS INDEX: 28.36 KG/M2 | HEART RATE: 72 BPM | DIASTOLIC BLOOD PRESSURE: 70 MMHG | OXYGEN SATURATION: 98 %

## 2022-12-16 DIAGNOSIS — D72.828 OTHER ELEVATED WHITE BLOOD CELL (WBC) COUNT: ICD-10-CM

## 2022-12-16 DIAGNOSIS — N50.89 TESTICULAR MASS: Primary | ICD-10-CM

## 2022-12-16 LAB
BASOPHILS # BLD AUTO: 0.08 10*3/MM3 (ref 0–0.2)
BASOPHILS NFR BLD AUTO: 0.8 % (ref 0–1.5)
DEPRECATED RDW RBC AUTO: 38.8 FL (ref 37–54)
EOSINOPHIL # BLD AUTO: 0.23 10*3/MM3 (ref 0–0.4)
EOSINOPHIL NFR BLD AUTO: 2.2 % (ref 0.3–6.2)
ERYTHROCYTE [DISTWIDTH] IN BLOOD BY AUTOMATED COUNT: 12.6 % (ref 12.3–15.4)
HCT VFR BLD AUTO: 44.6 % (ref 37.5–51)
HGB BLD-MCNC: 15.5 G/DL (ref 13–17.7)
IMM GRANULOCYTES # BLD AUTO: 0.04 10*3/MM3 (ref 0–0.05)
IMM GRANULOCYTES NFR BLD AUTO: 0.4 % (ref 0–0.5)
LYMPHOCYTES # BLD AUTO: 2.24 10*3/MM3 (ref 0.7–3.1)
LYMPHOCYTES NFR BLD AUTO: 21.1 % (ref 19.6–45.3)
MCH RBC QN AUTO: 29.3 PG (ref 26.6–33)
MCHC RBC AUTO-ENTMCNC: 34.8 G/DL (ref 31.5–35.7)
MCV RBC AUTO: 84.3 FL (ref 79–97)
MONOCYTES # BLD AUTO: 1.15 10*3/MM3 (ref 0.1–0.9)
MONOCYTES NFR BLD AUTO: 10.8 % (ref 5–12)
NEUTROPHILS NFR BLD AUTO: 6.87 10*3/MM3 (ref 1.7–7)
NEUTROPHILS NFR BLD AUTO: 64.7 % (ref 42.7–76)
NRBC BLD AUTO-RTO: 0 /100 WBC (ref 0–0.2)
PLATELET # BLD AUTO: 330 10*3/MM3 (ref 140–450)
PMV BLD AUTO: 10.5 FL (ref 6–12)
RBC # BLD AUTO: 5.29 10*6/MM3 (ref 4.14–5.8)
WBC NRBC COR # BLD: 10.61 10*3/MM3 (ref 3.4–10.8)

## 2022-12-16 PROCEDURE — 99213 OFFICE O/P EST LOW 20 MIN: CPT | Performed by: NURSE PRACTITIONER

## 2022-12-16 PROCEDURE — 85025 COMPLETE CBC W/AUTO DIFF WBC: CPT | Performed by: NURSE PRACTITIONER

## 2022-12-16 PROCEDURE — 36415 COLL VENOUS BLD VENIPUNCTURE: CPT | Performed by: NURSE PRACTITIONER

## 2022-12-16 NOTE — PROGRESS NOTES
"Chief Complaint   Patient presents with   • Testicle Pain     Lump on scrotum    • Referral-Urology       HPI  Artur Dias is a 22 y.o. male presents for a lump on both testicles.  He states this has been present for about 5 years.  He is now starting to have pain.  He states that he was told in the past that the lump on his left testicle was likely a cyst.  He has an appt in Jan with Thiago Coleman but he would like to see someone else.   He had an ultrasound done at [facility] a few years ago.  Has a first cousin with testicular cancer.      The following portions of the patient's history were reviewed and updated as appropriate: allergies, current medications, past family history, past medical history, past social history, past surgical history and problem list.    Subjective  Review of Systems   Constitutional: Negative for activity change, appetite change and fatigue.   HENT: Negative for congestion.    Respiratory: Negative for cough and shortness of breath.    Cardiovascular: Negative for chest pain and leg swelling.   Gastrointestinal: Negative for abdominal pain.   Genitourinary: Positive for frequency and testicular pain. Negative for scrotal swelling.   Neurological: Negative for dizziness, weakness and confusion.   Psychiatric/Behavioral: Negative for behavioral problems and decreased concentration.       Objective  Visit Vitals  /70   Pulse 72   Temp 97.3 °F (36.3 °C)   Ht 185.4 cm (73\")   Wt 97.1 kg (214 lb)   SpO2 98%   BMI 28.23 kg/m²        Physical Exam  Vitals and nursing note reviewed.   HENT:      Head: Normocephalic.   Eyes:      Pupils: Pupils are equal, round, and reactive to light.   Pulmonary:      Effort: Pulmonary effort is normal.   Genitourinary:     Comments: R testicle appears larger than left, palpable masses bilaterally, no redness or warmth, no tenderness  Skin:     General: Skin is warm and dry.      Capillary Refill: Capillary refill takes less than 2 seconds. "   Neurological:      General: No focal deficit present.      Mental Status: He is alert and oriented to person, place, and time.      Gait: Gait is intact.   Psychiatric:         Attention and Perception: Attention normal.         Mood and Affect: Mood normal.         Behavior: Behavior normal.          Procedures        Assessment and Plan  Diagnoses and all orders for this visit:    1. Testicular mass (Primary)  -     US Scrotum & Testicles; Future    2. Other elevated white blood cell (WBC) count  -     CBC w AUTO Differential; Future  -     CBC w AUTO Differential    Pt unable to void - voided before appt  Schedule U/S of testicles  Repeat CBC (was 13 in September)    No follow-ups on file.        Nicolás Block, APRN

## 2022-12-19 ENCOUNTER — TELEMEDICINE (OUTPATIENT)
Dept: PSYCHIATRY | Facility: CLINIC | Age: 22
End: 2022-12-19

## 2022-12-19 ENCOUNTER — TELEPHONE (OUTPATIENT)
Dept: INTERNAL MEDICINE | Facility: CLINIC | Age: 22
End: 2022-12-19

## 2022-12-19 DIAGNOSIS — F41.1 GENERALIZED ANXIETY DISORDER: Chronic | ICD-10-CM

## 2022-12-19 DIAGNOSIS — F40.10 SOCIAL ANXIETY DISORDER: ICD-10-CM

## 2022-12-19 DIAGNOSIS — F31.89 OTHER BIPOLAR DISORDER: Primary | Chronic | ICD-10-CM

## 2022-12-19 PROCEDURE — 99214 OFFICE O/P EST MOD 30 MIN: CPT

## 2022-12-19 RX ORDER — ARIPIPRAZOLE 10 MG/1
10 TABLET ORAL DAILY
Qty: 30 TABLET | Refills: 0 | Status: SHIPPED | OUTPATIENT
Start: 2022-12-19 | End: 2023-01-16

## 2022-12-19 RX ORDER — BUSPIRONE HYDROCHLORIDE 15 MG/1
15 TABLET ORAL 3 TIMES DAILY
Qty: 90 TABLET | Refills: 0 | Status: SHIPPED | OUTPATIENT
Start: 2022-12-19 | End: 2023-01-16 | Stop reason: SDUPTHER

## 2022-12-19 NOTE — TELEPHONE ENCOUNTER
Caller: Artur Dias    Relationship: Self    Best call back number: [phone]    What is the best time to reach you: ANYTIME AFTER 1PM    Who are you requesting to speak with (clinical staff, provider,  specific staff member): [facility]      What was the call regarding: PATIENT IS REQUESTING A STATUS ON THE ULTRASOUND THAT WAS SUPPOSE TO BE SCHEDULED AND THEN HE WAS TO BE REFERRED TO A UROLOGIST. PATIENT WOULD LIKE A CALLBACK TODAY TO DISCUSS AS HE STATED HE WOULD LIKE TO GET IT DONE AS SOON AS POSSIBLE. PATIENT ALSO WANTS TO DISCUSS HIS TEST RESULTS AS WELL    Do you require a callback: YES

## 2022-12-19 NOTE — PROGRESS NOTES
This provider is located at [place], [place]. The Patient is seen remotely using Video. Patient is being seen via telehealth and confirm that they are in a secure environment for this session. Patient is located in [place], [place] in his car. The patient's condition being diagnosed/treated is appropriate for telemedicine. Provider identified as Michael Nicholas as well as credentials APRN MSN PMHNP-BC.   The client/patient gave consent to be seen remotely, and when consent is given they understand that the consent allows for patient identifiable information to be sent to a third party as needed.  They may refuse to be seen remotely at any time. The electronic data is encrypted and password protected, and the patient has been advised of the potential risks to privacy not withstanding such measures.    Chief Complaint  Manic Behavior, Depression, and Anxiety    Subjective        Artur iDas presents to [facility] for   History of Present Illness  Patient is seen today for follow-up visit for bipolar disorder and anxiety.  Patient states he was able to tolerate the Abilify much better than the Lamictal.  States he is not had any recurrence of any hypomanic episodes in the last month.  States he continues to feel some depression and anxiety.  He states the 1 thing he does not like about Abilify is that it gives him energy once he takes it, but he feels like he crashes in the middle of the day.  Rates depression at a 4 on most days, but states when he gets down it can get up to a 7.  He would average out to 5-6 on a 1-10 scale with 10 being the worst.  Rates anxiety a 5 on a 1-10 scale with 10 being the worst.  States BuSpar is helpful with managing his anxiety, but sometimes he misses the middle dose of the day and he notices a difference.  He states that he continues to get an overwhelmed type feeling and nervous when going into social situations and crowds.  He states that he was  unable to tolerate the hydroxyzine during the day, but he has been taking it at night and it is helpful with his sleep.  States that he is getting 7 hours sleep per night.  Appetite is good.  No recurrence of any hypomania.  Denies any paranoia.  Denies any auditory or visual hallucinations.  Denies any side effects to the medication.  States he has been stressed out more recently because they found a lump on his testicle.  States he is going to the doctor to get that checked out and will more than likely have surgery.  States his work is shut down until 1 January after this week, so he is hopeful to get the surgery done in that timeframe.  Objective   Vital Signs:   There were no vitals taken for this visit.      Mental Status Exam:   Hygiene:   good  Cooperation:  Cooperative  Eye Contact:  Good  Psychomotor Behavior:  Appropriate  Affect:  Full range  Mood: normal  Speech:  Normal  Thought Process:  Goal directed  Thought Content:  Normal  Suicidal:  None  Homicidal:  None  Hallucinations:  None  Delusion:  None  Memory:  Intact  Orientation:  Person, Place, Time and Situation  Reliability:  good  Insight:  Good  Judgement:  Good  Impulse Control:  Good  Physical/Medical Issues:  No      Previous Provider notes and available records reviewed by this APRN today.   Current Medications:   Current Outpatient Medications   Medication Sig Dispense Refill   • acyclovir (ZOVIRAX) 400 MG tablet Take 400 mg by mouth Every 4 (Four) Hours While Awake. Take no more than 5 doses a day.     • ARIPiprazole (Abilify) 10 MG tablet Take 1 tablet by mouth Daily. 30 tablet 0   • busPIRone (BUSPAR) 15 MG tablet Take 1 tablet by mouth 3 (Three) Times a Day. 90 tablet 0   • hydrOXYzine (ATARAX) 10 MG tablet Take 1 tablet by mouth 3 (Three) Times a Day As Needed for Anxiety. 90 tablet 0   • naproxen (NAPROSYN) 250 MG tablet Take 250 mg by mouth 2 (Two) Times a Day As Needed.       No current facility-administered medications for this  visit.       Physical Exam   Result Review :    The following data was reviewed by: SHAVONNE Gillis on 12/19/2022:  Common labs    Common Labs 9/7/22 9/19/22 9/19/22 12/16/22     1606 1606    Glucose   79    BUN   10    Creatinine   0.79    Sodium   141    Potassium   4.2    Chloride   101    Calcium   10.0    Albumin   4.80    Total Bilirubin   0.2    Alkaline Phosphatase   71    AST (SGOT)   18    ALT (SGPT)   28    WBC 9.14 13.21 (A)  10.61   Hemoglobin 14.8 15.1  15.5   Hematocrit 41.9 43.8  44.6   Platelets 302 313  330   (A) Abnormal value               Assessment and Plan   Problem List Items Addressed This Visit        Mental Health    Other bipolar disorder (HCC) - Primary    Relevant Medications    busPIRone (BUSPAR) 15 MG tablet    ARIPiprazole (Abilify) 10 MG tablet    Generalized anxiety disorder    Relevant Medications    busPIRone (BUSPAR) 15 MG tablet    ARIPiprazole (Abilify) 10 MG tablet    Social anxiety disorder    Relevant Medications    busPIRone (BUSPAR) 15 MG tablet    ARIPiprazole (Abilify) 10 MG tablet     Discussed treatment options with patient.  Discussed with patient that the crash that he is experiencing could be for his symptoms are not fully controlled yet.  Discussed with patient increasing Abilify to 10 mg daily to further stabilize his mood.  Patient agreeable.  Increase Abilify to 10 mg daily for bipolar disorder.  Patient states he still having a lot of anxiety when he goes into social situations in stores.  Increase BuSpar to 15 mg 3 times daily for anxiety.  Patient has a supply of hydroxyzine on hand if he were to need it.  Encouraged patient to try to take half dose of hydroxyzine to see if he could tolerate it during the day.  We will see patient again in 4 weeks to reassess.  Encouraged patient to contact the office if he had any issues sooner.    TREATMENT PLAN/GOALS: Continue supportive psychotherapy efforts and medications as indicated. Treatment and medication  options discussed during today's visit. Patient ackowledged and verbally consented to continue with current treatment plan and was educated on the importance of compliance with treatment and follow-up appointments.    DEPRESSION:  Patient screened positive for depression based on a PHQ-9 score of 0 on 9/19/2022. Follow-up recommendations include: Prescribed antidepressant medication treatment.       MEDICATION ISSUES:  We discussed risks, benefits, and side effects of the above medications and the patient was agreeable with the plan. Patient was educated on the importance of compliance with treatment and follow-up appointments.  Patient is agreeable to call the office with any worsening of symptoms or onset of side effects. Patient is agreeable to call 911 or go to the nearest ER should he/she begin having SI/HI.      Counseled patient regarding multimodal approach with healthy nutrition, healthy sleep, regular physical activity, social activities, counseling, and medications.      Coping skills reviewed and encouraged positive framing of thoughts     Assisted patient in processing above session content; acknowledged and normalized patient’s thoughts, feelings, and concerns.  Applied  positive coping skills and behavior management in session.  Allowed patient to freely discuss issues without interruption or judgment. Provided safe, confidential environment to facilitate the development of positive therapeutic relationship and encourage open, honest communication. Assisted patient in identifying risk factors which would indicate the need for higher level of care including thoughts to harm self or others and/or self-harming behavior and encouraged patient to contact this office, call 911, or present to the nearest emergency room should any of these events occur. Discussed crisis intervention services and means to access. If patient has any concerns or needs assistance they were instructed to call the Behavioral Health  [facility] at [phone].    MEDS ORDERED DURING VISIT:  New Medications Ordered This Visit   Medications   • busPIRone (BUSPAR) 15 MG tablet     Sig: Take 1 tablet by mouth 3 (Three) Times a Day.     Dispense:  90 tablet     Refill:  0   • ARIPiprazole (Abilify) 10 MG tablet     Sig: Take 1 tablet by mouth Daily.     Dispense:  30 tablet     Refill:  0         Follow Up   Return in about 4 weeks (around 1/16/2023) for Video visit.    Patient was given instructions and counseling regarding his condition or for health maintenance advice. Please see specific information pulled into the AVS if appropriate.         This document has been electronically signed by SHAVONNE Gillis  December 19, 2022 15:57 EST    Part of this note may be an electronic transcription/translation of spoken language to printed text using the Dragon Dictation System.

## 2023-01-16 ENCOUNTER — LAB (OUTPATIENT)
Dept: LAB | Facility: HOSPITAL | Age: 23
End: 2023-01-16
Payer: COMMERCIAL

## 2023-01-16 ENCOUNTER — OFFICE VISIT (OUTPATIENT)
Dept: INTERNAL MEDICINE | Facility: CLINIC | Age: 23
End: 2023-01-16
Payer: COMMERCIAL

## 2023-01-16 VITALS
OXYGEN SATURATION: 96 % | WEIGHT: 215 LBS | SYSTOLIC BLOOD PRESSURE: 130 MMHG | HEART RATE: 86 BPM | HEIGHT: 73 IN | BODY MASS INDEX: 28.49 KG/M2 | DIASTOLIC BLOOD PRESSURE: 80 MMHG

## 2023-01-16 DIAGNOSIS — R53.1 WEAKNESS: ICD-10-CM

## 2023-01-16 DIAGNOSIS — Z79.899 MEDICATION MANAGEMENT: ICD-10-CM

## 2023-01-16 DIAGNOSIS — R42 LIGHTHEADEDNESS: ICD-10-CM

## 2023-01-16 DIAGNOSIS — F40.10 SOCIAL ANXIETY DISORDER: ICD-10-CM

## 2023-01-16 DIAGNOSIS — F41.1 GENERALIZED ANXIETY DISORDER: Chronic | ICD-10-CM

## 2023-01-16 DIAGNOSIS — D72.821 MONOCYTOSIS: Primary | ICD-10-CM

## 2023-01-16 LAB
BASOPHILS # BLD AUTO: 0.05 10*3/MM3 (ref 0–0.2)
BASOPHILS NFR BLD AUTO: 0.6 % (ref 0–1.5)
DEPRECATED RDW RBC AUTO: 40.8 FL (ref 37–54)
EOSINOPHIL # BLD AUTO: 0.31 10*3/MM3 (ref 0–0.4)
EOSINOPHIL NFR BLD AUTO: 3.7 % (ref 0.3–6.2)
ERYTHROCYTE [DISTWIDTH] IN BLOOD BY AUTOMATED COUNT: 13 % (ref 12.3–15.4)
HCT VFR BLD AUTO: 46.1 % (ref 37.5–51)
HGB BLD-MCNC: 15.8 G/DL (ref 13–17.7)
IMM GRANULOCYTES # BLD AUTO: 0.04 10*3/MM3 (ref 0–0.05)
IMM GRANULOCYTES NFR BLD AUTO: 0.5 % (ref 0–0.5)
LYMPHOCYTES # BLD AUTO: 2.02 10*3/MM3 (ref 0.7–3.1)
LYMPHOCYTES NFR BLD AUTO: 24.1 % (ref 19.6–45.3)
MCH RBC QN AUTO: 29.9 PG (ref 26.6–33)
MCHC RBC AUTO-ENTMCNC: 34.3 G/DL (ref 31.5–35.7)
MCV RBC AUTO: 87.3 FL (ref 79–97)
MONOCYTES # BLD AUTO: 0.7 10*3/MM3 (ref 0.1–0.9)
MONOCYTES NFR BLD AUTO: 8.4 % (ref 5–12)
NEUTROPHILS NFR BLD AUTO: 5.26 10*3/MM3 (ref 1.7–7)
NEUTROPHILS NFR BLD AUTO: 62.7 % (ref 42.7–76)
NRBC BLD AUTO-RTO: 0 /100 WBC (ref 0–0.2)
PLATELET # BLD AUTO: 336 10*3/MM3 (ref 140–450)
PMV BLD AUTO: 10.6 FL (ref 6–12)
RBC # BLD AUTO: 5.28 10*6/MM3 (ref 4.14–5.8)
WBC NRBC COR # BLD: 8.38 10*3/MM3 (ref 3.4–10.8)

## 2023-01-16 PROCEDURE — 85025 COMPLETE CBC W/AUTO DIFF WBC: CPT | Performed by: INTERNAL MEDICINE

## 2023-01-16 PROCEDURE — 99214 OFFICE O/P EST MOD 30 MIN: CPT | Performed by: INTERNAL MEDICINE

## 2023-01-16 PROCEDURE — 82728 ASSAY OF FERRITIN: CPT | Performed by: INTERNAL MEDICINE

## 2023-01-16 RX ORDER — HYDROXYZINE HYDROCHLORIDE 10 MG/1
10 TABLET, FILM COATED ORAL 3 TIMES DAILY PRN
Qty: 90 TABLET | Refills: 0 | Status: SHIPPED | OUTPATIENT
Start: 2023-01-16 | End: 2023-03-06

## 2023-01-16 RX ORDER — BUSPIRONE HYDROCHLORIDE 15 MG/1
15 TABLET ORAL 3 TIMES DAILY
Qty: 90 TABLET | Refills: 0 | Status: SHIPPED | OUTPATIENT
Start: 2023-01-16 | End: 2023-03-06

## 2023-01-16 NOTE — PROGRESS NOTES
"Subjective   Artur Dias is a 22 y.o. male here for lightheadedness and weakness in arms.  He says this has been occurring over the last 2 months or so.  He says it happens every day.  Says he will get a heavy feeling in his arms and that goes along with some lightheadedness.  He feels like he cannot lift things though he is not dropped due to fatigue.  It is more just a subjective feeling of weakness.  The lightheadedness is not described as a room spinning sensation.  He went to urgent care twice regarding and had some blood work done which initially showed leukocytosis that resolved but this last 1 showed monocytosis.  He does take a weight loss aide every day to help him with energy.  He has history of drug abuse but has been clean for 5 years.  He presents with his young son today.  No family history of neurological disorders.  No headaches or change in vision or other neurological symptoms other than in the arms.    I have reviewed the following portions of the patient's history and confirmed they are accurate: current medications, past family history, past medical history, past social history and problem list     I have personally reviewed and performed the ROS. Latisha Henry MD     Review of Systems:  General: negative  HEENT: Negative  Neuro: See HPI  MSK: Negative    Objective   /80 (BP Location: Left arm, Patient Position: Sitting)   Pulse 86   Ht 185.4 cm (73\")   Wt 97.5 kg (215 lb)   SpO2 96%   BMI 28.37 kg/m²     Physical Exam  Vitals reviewed.   Constitutional:       Appearance: He is well-developed.   Pulmonary:      Effort: Pulmonary effort is normal.   Skin:     General: Skin is warm and dry.   Neurological:      Mental Status: He is alert and oriented to person, place, and time.      Cranial Nerves: No cranial nerve deficit.      Comments: No weakness in all tested muscles of upper extremities including hand , no muscle wasting or tremor noted   Psychiatric:         " Behavior: Behavior normal.         Thought Content: Thought content normal.         Judgment: Judgment normal.         Assessment & Plan   Diagnoses and all orders for this visit:    1. Monocytosis (Primary)  -     CBC & Differential  -new finding on last cbc, leukocytosis resolved  -Reviewed last set of labs and Dr. Guzman's last note    2. Weakness  -     Ambulatory Referral to Neurology  -     EMG & Nerve Conduction Test; Future    3. Lightheadedness  -     Ambulatory Referral to Neurology    4. Medication management  -     Ferritin             Latisha Henry MD

## 2023-01-16 NOTE — LETTER
January 16, 2023    Artur Dias  [address]  [facility] [address]      To whom it may concern:    It is my recommendation that patient be moved to [employer]pection (as an example) while undergoing neurological workup for lightheadedness.    Sincerely,    Latisha Henry MD

## 2023-01-17 LAB — FERRITIN SERPL-MCNC: 124 NG/ML (ref 30–400)

## 2023-02-27 ENCOUNTER — HOSPITAL ENCOUNTER (OUTPATIENT)
Dept: NEUROLOGY | Facility: HOSPITAL | Age: 23
Discharge: HOME | End: 2023-02-27
Admitting: INTERNAL MEDICINE
Payer: COMMERCIAL

## 2023-02-27 ENCOUNTER — TELEPHONE (OUTPATIENT)
Dept: INTERNAL MEDICINE | Facility: CLINIC | Age: 23
End: 2023-02-27
Payer: COMMERCIAL

## 2023-02-27 DIAGNOSIS — R53.1 WEAKNESS: ICD-10-CM

## 2023-02-27 PROCEDURE — 95886 MUSC TEST DONE W/N TEST COMP: CPT

## 2023-02-27 PROCEDURE — 95910 NRV CNDJ TEST 7-8 STUDIES: CPT

## 2023-02-27 NOTE — TELEPHONE ENCOUNTER
----- Message from Latisha Henry MD sent at 2/27/2023 10:48 AM EST -----  Please call the patient regarding his abnormal result. Tell him he has mild carpal tunnel on the right, which could account for the intermittent weakness and numbness in that hand/arm at least. The left was normal. I would rec he use a carpal tunnel wrist splint at night. If he is still having issues, I do rec he see Dr. Guzman again for f/u.

## 2023-02-27 NOTE — TELEPHONE ENCOUNTER
Spoke with patient about nerve conduction test. Patient verbalized understanding of all instructions given and complied. No further action needed at this time.

## 2023-03-01 ENCOUNTER — TELEPHONE (OUTPATIENT)
Dept: INTERNAL MEDICINE | Facility: CLINIC | Age: 23
End: 2023-03-01

## 2023-03-01 NOTE — TELEPHONE ENCOUNTER
Caller: Artur Dias Jr.    Relationship: Self    Best call back number: [phone]    What is the medical concern/diagnosis: ER VISITS WITH CHEST PAIN    What specialty or service is being requested: CARDIOLOGIST    What is the provider, practice or medical service name: PCP  [facility]    What is the office location: [place]    What is the office phone number:     Any additional details:

## 2023-03-01 NOTE — TELEPHONE ENCOUNTER
Dr. Henry this is a lynda patient he sent you a message back in January about some symptoms he was having from his Antipsychotic medication. Patient is requesting a referral to see Cardiology due to chest pains. Patient was recently seen in the er.

## 2023-03-01 NOTE — TELEPHONE ENCOUNTER
"He really needs to be seen. I see an ER visit for chest pain 1/19, it was listed as \"noncardiac chest pain.\" Need to get info on the chest pain for the referral. He can schedule with Thomas, she will be back.  "

## 2023-03-06 ENCOUNTER — OFFICE VISIT (OUTPATIENT)
Dept: INTERNAL MEDICINE | Facility: CLINIC | Age: 23
End: 2023-03-06
Payer: COMMERCIAL

## 2023-03-06 VITALS
BODY MASS INDEX: 28.23 KG/M2 | HEIGHT: 73 IN | OXYGEN SATURATION: 99 % | HEART RATE: 69 BPM | DIASTOLIC BLOOD PRESSURE: 82 MMHG | SYSTOLIC BLOOD PRESSURE: 124 MMHG | TEMPERATURE: 97.3 F | WEIGHT: 213 LBS

## 2023-03-06 DIAGNOSIS — J32.9 SINUSITIS, UNSPECIFIED CHRONICITY, UNSPECIFIED LOCATION: ICD-10-CM

## 2023-03-06 DIAGNOSIS — R41.840 CONCENTRATION DEFICIT: ICD-10-CM

## 2023-03-06 DIAGNOSIS — R07.9 CHEST PAIN, UNSPECIFIED TYPE: Primary | ICD-10-CM

## 2023-03-06 DIAGNOSIS — Z72.0 TOBACCO ABUSE: ICD-10-CM

## 2023-03-06 DIAGNOSIS — K21.9 GERD WITHOUT ESOPHAGITIS: ICD-10-CM

## 2023-03-06 DIAGNOSIS — F31.89 OTHER BIPOLAR DISORDER: ICD-10-CM

## 2023-03-06 PROCEDURE — 99214 OFFICE O/P EST MOD 30 MIN: CPT | Performed by: INTERNAL MEDICINE

## 2023-03-06 RX ORDER — OMEPRAZOLE 40 MG/1
40 CAPSULE, DELAYED RELEASE ORAL DAILY
Qty: 30 CAPSULE | Refills: 5 | Status: SHIPPED | OUTPATIENT
Start: 2023-03-06 | End: 2023-03-27

## 2023-03-06 RX ORDER — AMOXICILLIN AND CLAVULANATE POTASSIUM 875; 125 MG/1; MG/1
1 TABLET, FILM COATED ORAL 2 TIMES DAILY
Qty: 20 TABLET | Refills: 0 | Status: SHIPPED | OUTPATIENT
Start: 2023-03-06 | End: 2023-03-27

## 2023-03-06 RX ORDER — LORATADINE/PSEUDOEPHEDRINE 10MG-240MG
1 TABLET, EXTENDED RELEASE 24 HR ORAL DAILY
COMMUNITY
Start: 2022-12-28 | End: 2023-03-27

## 2023-03-07 NOTE — PROGRESS NOTES
Office Note      Date: 2023  Patient Name: Artur Dias Jr.  MRN: [id number]  : 2000    Chief Complaint   Patient presents with   • Heart Problem     Wants cardiology referral     • URI   • Earache     Bilatetal       History of Present Illness: Artur Dias Jr. is a 22 y.o. male who presents for Heart Problem (Wants cardiology referral/), URI, and Earache (Bilatetal). Has been seen multiple times in ED for CP with neg workup. No longer takig bipolar meds and feels like mood is ok. Does have a lot of stress. Thinks he may have ADHD and would like testing for this. Would like to see a new psych specialist. Has sinus congestion and was taking left over amoxicilllin once daily with some improvement.   No current CP.  Subjective      Review of Systems:   Pertinent review of systems per HPI.    Review of Systems   Constitutional: Negative for activity change, appetite change, chills, diaphoresis and fatigue.   HENT: Negative for congestion, dental problem, drooling, ear discharge, ear pain, facial swelling and hearing loss.    Eyes: Negative for photophobia, pain, discharge, redness, itching and visual disturbance.   Respiratory: Negative for cough, choking, chest tightness and shortness of breath.    Cardiovascular: Positive for chest pain. Negative for palpitations and leg swelling.   Endocrine: Negative for cold intolerance, heat intolerance, polydipsia and polyuria.   Genitourinary: Negative for difficulty urinating, dysuria, flank pain, frequency and hematuria.   Musculoskeletal: Negative for arthralgias and back pain.   Skin: Negative for color change, pallor, rash and wound.   Allergic/Immunologic: Negative for environmental allergies.   Neurological: Negative for dizziness, facial asymmetry, light-headedness and headaches.   Psychiatric/Behavioral: Negative.    All other systems reviewed and are negative.    Allergies   Allergen Reactions   • Doxycycline Rash       Objective  "    Physical Exam:  Vital Signs:   Vitals:    03/06/23 1050   BP: 124/82   Pulse: 69   Temp: 97.3 °F (36.3 °C)   SpO2: 99%   Weight: 96.6 kg (213 lb)   Height: 185.4 cm (73\")   PainSc:   2   PainLoc: Ear  Comment: Bilateral      Body mass index is 28.1 kg/m².    Physical Exam  Vitals and nursing note reviewed.   Constitutional:       General: He is not in acute distress.     Appearance: He is well-developed.   HENT:      Head: Normocephalic and atraumatic.      Right Ear: External ear normal.      Left Ear: External ear normal.   Eyes:      General: No scleral icterus.        Right eye: No discharge.         Left eye: No discharge.      Conjunctiva/sclera: Conjunctivae normal.   Cardiovascular:      Rate and Rhythm: Normal rate and regular rhythm.      Heart sounds: Normal heart sounds. No murmur heard.    No friction rub. No gallop.   Pulmonary:      Effort: Pulmonary effort is normal. No respiratory distress.      Breath sounds: Normal breath sounds. No wheezing or rales.   Skin:     General: Skin is warm and dry.      Coloration: Skin is not pale.         Assessment / Plan      Assessment & Plan:    1. Chest pain, unspecified type  No current CP. Will try treating for GERD. Referral placed  - Ambulatory Referral to Cardiology    2. Tobacco abuse  counselled on tobacco cessation    3. GERD without esophagitis  counsled on s/s of GERD including CP. rx for prilosec  - omeprazole (priLOSEC) 40 MG capsule; Take 1 capsule by mouth Daily.  Dispense: 30 capsule; Refill: 5    4. Concentration deficit    - Ambulatory Referral to Neuropsychology    5. Other bipolar disorder (HCC)    - Ambulatory Referral to Psychiatry    6. Sinusitis, unspecified chronicity, unspecified location  New acute medical issue. rx for augmentin.   - amoxicillin-clavulanate (Augmentin) 875-125 MG per tablet; Take 1 tablet by mouth 2 (Two) Times a Day.  Dispense: 20 tablet; Refill: 0      Kerry Guzman MD  03/06/2023   "

## 2023-03-27 ENCOUNTER — OFFICE VISIT (OUTPATIENT)
Dept: CARDIOLOGY | Facility: CLINIC | Age: 23
End: 2023-03-27
Payer: COMMERCIAL

## 2023-03-27 VITALS
SYSTOLIC BLOOD PRESSURE: 124 MMHG | DIASTOLIC BLOOD PRESSURE: 64 MMHG | BODY MASS INDEX: 28.97 KG/M2 | HEIGHT: 73 IN | WEIGHT: 218.6 LBS | OXYGEN SATURATION: 96 % | HEART RATE: 83 BPM

## 2023-03-27 DIAGNOSIS — R07.89 CHEST PAIN, MUSCULOSKELETAL: Primary | ICD-10-CM

## 2023-03-27 PROCEDURE — 93000 ELECTROCARDIOGRAM COMPLETE: CPT | Performed by: PHYSICIAN ASSISTANT

## 2023-03-27 PROCEDURE — 99203 OFFICE O/P NEW LOW 30 MIN: CPT | Performed by: PHYSICIAN ASSISTANT

## 2023-03-27 RX ORDER — NAPROXEN 500 MG/1
500 TABLET ORAL AS NEEDED
COMMUNITY

## 2023-03-27 NOTE — PROGRESS NOTES
"[facility]  INITIAL OFFICE CONSULT      Artur Dias Jr.  2000  PCP: Kerry Guzman MD    SUBJECTIVE:   Artur Dias Jr. is a 22 y.o. male seen for a consultation visit regarding the following:     Chief Complaint:   Chief Complaint   Patient presents with   • Chest Pain   • Dizziness   • Shortness of Breath          Consultation is requested by Kerry Guzman MD for evaluation of Chest Pain, Dizziness, and Shortness of Breath        History:  Pleasant 22-year-old gentleman wishes to be seen today with the regards to difficulty with chest pain palpitations.  He denies any previous cardiac history.  He does not Bory and is concerned that he has done damage to his heart as he is a previous heavy illicit drug use.  He is now been sober more than 5 days.  He states in the past couple years she has had chest pain.  Describes a sharp stabbing pains sometimes left-sided chest sometimes right sometimes in the middle.  These are not particular sounds exertion can happen randomly.  They are worrisome to him.  They happen pretty frequently.  He has been to the ER on 2 separate occasions and was told everything was \"fine\".  In addition this he has had some near syncope events.  He describes to me that at work sometimes he will get lightheaded and weak when he tries to lift heavy boxes.  He has not completely passed out but felt as though he may.  In addition to this he has had some numbness in his arms this happens randomly as well he wonders he has some disc disease in his neck.  He has not any melisa syncope.  He does not does not describe any heart failure symptoms like orthopnea PND or peripheral edema.  He does admit to using several energy drinks a day he is working night shift at [employer].  During the night he will have 2-3 energy drinks sometimes more.  He does not drink much water.  In addition above he has had times where he wakes up in the [place] with " his heart racing.  This been going off and on for some time.  He does not think he has sleep apnea.      Cardiac PMH: (Old records have been reviewed and summarized below)  1. Chest pain  2. Palpitations  3. GERD  4. On going tobacco abuse  5. Bipolar disorder, anxiety  6. Recent sinus congestion, URI followed by PCP recently.  Now resolved.  7. Illicit drug abuse: This has resolved.  He states that he has been sober for more than 5 years.  He was a previous user of meth, heroin, cocaine.  8. Excessive caffeine use, energy drinks on a regular basis.      Past Medical History, Past Surgical History, Family history, Social History, and Medications were all reviewed with the patient today and updated as necessary.     Current Outpatient Medications   Medication Sig Dispense Refill   • naproxen (NAPROSYN) 500 MG tablet Take 1 tablet by mouth As Needed for Mild Pain.     • Vortioxetine HBr (Trintellix) 5 MG tablet tablet Take 1 tablet by mouth Daily With Breakfast.       No current facility-administered medications for this visit.     Allergies   Allergen Reactions   • Doxycycline Rash         Past Medical History:   Diagnosis Date   • Arthritis    • Bipolar 1 disorder (HCC)    • Carpal tunnel syndrome of right wrist 02/27/2023   • DDD (degenerative disc disease), lumbar    • HSV (herpes simplex virus) anogenital infection      Past Surgical History:   Procedure Laterality Date   • CHOLECYSTECTOMY  2019     Family History   Problem Relation Age of Onset   • Liver disease Mother    • Mental illness Mother    • Heart attack Father    • No Known Problems Sister    • No Known Problems Sister    • Diabetes Maternal Grandfather      Social History     Tobacco Use   • Smoking status: Every Day     Packs/day: 1.50     Years: 10.00     Pack years: 15.00     Types: Cigarettes     Start date: 2012   • Smokeless tobacco: Never   Substance Use Topics   • Alcohol use: Yes     Comment: rarely       ROS:  Review of Symptoms:  General:  "no recent weight loss/gain, weakness or fatigue  Skin: no rashes, lumps, or other skin changes  HEENT: no dizziness, lightheadedness, or vision changes  Respiratory: no cough or hemoptysis  Cardiovascular: + palpitations, and tachycardia  Gastrointestinal: no black/tarry stools or diarrhea  Urinary: no change in frequency or urgency  Peripheral Vascular: no claudication or leg cramps  Musculoskeletal: no muscle or joint pain/stiffness  Psychiatric: Bipolar disorder  Neurological: no sensory or motor loss, no syncope  Hematologic: no anemia, easy bruising or bleeding  Endocrine: no thyroid problems, nor heat or cold intolerance         PHYSICAL EXAM:   /64 (BP Location: Right arm, Patient Position: Sitting)   Pulse 83   Ht 185.4 cm (73\")   Wt 99.2 kg (218 lb 9.6 oz)   SpO2 96%   BMI 28.84 kg/m²      Wt Readings from Last 5 Encounters:   03/27/23 99.2 kg (218 lb 9.6 oz)   03/06/23 96.6 kg (213 lb)   01/16/23 97.5 kg (215 lb)   12/16/22 97.1 kg (214 lb)   09/19/22 95.3 kg (210 lb)     BP Readings from Last 5 Encounters:   03/27/23 124/64   03/06/23 124/82   01/16/23 130/80   12/16/22 132/70   09/19/22 130/80       General-Well Nourished, Well developed  Eyes - PERRLA  Neck- supple, No mass  CV- regular rate and rhythm, no MRG  Lung- clear bilaterally  Abd- soft, +BS  Musc/skel - Norm strength and range of motion  Skin- warm and dry  Neuro - Alert & Oriented x 3, appropriate mood.    Patient's external notes were reviewed.  Independent interpretation of test performed by another physician in facility were reviewed.  Outside laboratory data was also reviewed.    Medical problems and test results were reviewed with the patient today.     Results for orders placed or performed in visit on 01/16/23   Ferritin    Specimen: Blood   Result Value Ref Range    Ferritin 124.00 30.00 - 400.00 ng/mL   CBC Auto Differential    Specimen: Blood   Result Value Ref Range    WBC 8.38 3.40 - 10.80 10*3/mm3    RBC 5.28 4.14 - " 5.80 10*6/mm3    Hemoglobin 15.8 13.0 - 17.7 g/dL    Hematocrit 46.1 37.5 - 51.0 %    MCV 87.3 79.0 - 97.0 fL    MCH 29.9 26.6 - 33.0 pg    MCHC 34.3 31.5 - 35.7 g/dL    RDW 13.0 12.3 - 15.4 %    RDW-SD 40.8 37.0 - 54.0 fl    MPV 10.6 6.0 - 12.0 fL    Platelets 336 140 - 450 10*3/mm3    Neutrophil % 62.7 42.7 - 76.0 %    Lymphocyte % 24.1 19.6 - 45.3 %    Monocyte % 8.4 5.0 - 12.0 %    Eosinophil % 3.7 0.3 - 6.2 %    Basophil % 0.6 0.0 - 1.5 %    Immature Grans % 0.5 0.0 - 0.5 %    Neutrophils, Absolute 5.26 1.70 - 7.00 10*3/mm3    Lymphocytes, Absolute 2.02 0.70 - 3.10 10*3/mm3    Monocytes, Absolute 0.70 0.10 - 0.90 10*3/mm3    Eosinophils, Absolute 0.31 0.00 - 0.40 10*3/mm3    Basophils, Absolute 0.05 0.00 - 0.20 10*3/mm3    Immature Grans, Absolute 0.04 0.00 - 0.05 10*3/mm3    nRBC 0.0 0.0 - 0.2 /100 WBC         Lab Results   Component Value Date    CHOL 133 10/16/2020    HDL 38 (L) 10/16/2020    LDL 62 10/16/2020    VLDL 33 10/16/2020       EKG:  (EKG/Tracing has been independently visualized by me and summarized below)      ECG 12 Lead    Date/Time: 3/27/2023 10:40 AM  Performed by: Derrek Vera PA  Authorized by: Derrek Vera PA   Rhythm: sinus rhythm  Rate: normal  Conduction: conduction normal  ST Segments: ST segments normal  T Waves: T waves normal  QRS axis: normal  Other: no other findings    Clinical impression: normal ECG            ASSESSMENT   1. Chest pain the symptoms are atypical for angina pectoris.  He has been to the ER 2 separate occasions.  Does request to have a stress test to rule out any possibility this could be related ischemic heart disease.    2. Tobacco abuse.  Discussed in detail the patient is to stop smoking immediately and the dangers of not doing so.    3.  Near syncope events: I suspect some these may be due to marginal blood pressure.  He is drinking a lot of energy drinks in the evening hours and not much water.  Some these events may be vasovagal in  nature.    4.  Illicit drug use, patient been sober for over 5 years.      PLAN  · GXT stress test  · Echocardiogram  · 48-hour monitor  · Stop energy drinks increase hydration Gatorade Powerade  · Stop smoking  · Return for office 2 to 3 months or sooner as needed    Electronically signed by JAMAL Cardenas, 03/27/23, 10:44 AM EDT.            Cardiology/Electrophysiology  03/27/23  10:37 EDT  Will Jody VERA

## 2023-05-01 ENCOUNTER — HOSPITAL ENCOUNTER (OUTPATIENT)
Dept: CARDIOLOGY | Facility: HOSPITAL | Age: 23
Discharge: HOME | End: 2023-05-01
Payer: COMMERCIAL

## 2023-05-01 VITALS — HEIGHT: 73 IN | BODY MASS INDEX: 28.89 KG/M2 | WEIGHT: 218 LBS

## 2023-05-01 DIAGNOSIS — R07.89 CHEST PAIN, MUSCULOSKELETAL: ICD-10-CM

## 2023-05-01 LAB
BH CV ECHO MEAS - AO MAX PG: 6.2 MMHG
BH CV ECHO MEAS - AO MEAN PG: 3 MMHG
BH CV ECHO MEAS - AO ROOT DIAM: 3.1 CM
BH CV ECHO MEAS - AO V2 MAX: 124 CM/SEC
BH CV ECHO MEAS - AO V2 VTI: 22.7 CM
BH CV ECHO MEAS - AVA(I,D): 2.7 CM2
BH CV ECHO MEAS - EDV(CUBED): 73.8 ML
BH CV ECHO MEAS - EDV(MOD-SP2): 91.2 ML
BH CV ECHO MEAS - EDV(MOD-SP4): 85.2 ML
BH CV ECHO MEAS - EF(MOD-BP): 53.7 %
BH CV ECHO MEAS - EF(MOD-SP2): 52.5 %
BH CV ECHO MEAS - EF(MOD-SP4): 53.4 %
BH CV ECHO MEAS - ESV(CUBED): 41.2 ML
BH CV ECHO MEAS - ESV(MOD-SP2): 43.3 ML
BH CV ECHO MEAS - ESV(MOD-SP4): 39.7 ML
BH CV ECHO MEAS - FS: 17.7 %
BH CV ECHO MEAS - IVS/LVPW: 0.82 CM
BH CV ECHO MEAS - IVSD: 0.88 CM
BH CV ECHO MEAS - LA DIMENSION: 2.6 CM
BH CV ECHO MEAS - LAT PEAK E' VEL: 13.4 CM/SEC
BH CV ECHO MEAS - LV DIASTOLIC VOL/BSA (35-75): 38.2 CM2
BH CV ECHO MEAS - LV MASS(C)D: 132.2 GRAMS
BH CV ECHO MEAS - LV MAX PG: 4.8 MMHG
BH CV ECHO MEAS - LV MEAN PG: 2 MMHG
BH CV ECHO MEAS - LV SYSTOLIC VOL/BSA (12-30): 17.8 CM2
BH CV ECHO MEAS - LV V1 MAX: 110 CM/SEC
BH CV ECHO MEAS - LV V1 VTI: 19 CM
BH CV ECHO MEAS - LVIDD: 4.2 CM
BH CV ECHO MEAS - LVIDS: 3.5 CM
BH CV ECHO MEAS - LVOT AREA: 3.2 CM2
BH CV ECHO MEAS - LVOT DIAM: 2.03 CM
BH CV ECHO MEAS - LVPWD: 1.07 CM
BH CV ECHO MEAS - MED PEAK E' VEL: 10.8 CM/SEC
BH CV ECHO MEAS - MV A MAX VEL: 55.7 CM/SEC
BH CV ECHO MEAS - MV DEC SLOPE: 331.8 CM/SEC2
BH CV ECHO MEAS - MV DEC TIME: 0.23 MSEC
BH CV ECHO MEAS - MV E MAX VEL: 75.4 CM/SEC
BH CV ECHO MEAS - MV E/A: 1.35
BH CV ECHO MEAS - MV MAX PG: 2.8 MMHG
BH CV ECHO MEAS - MV MEAN PG: 1.38 MMHG
BH CV ECHO MEAS - MV P1/2T: 76.7 MSEC
BH CV ECHO MEAS - MV V2 VTI: 20.6 CM
BH CV ECHO MEAS - MVA(P1/2T): 2.9 CM2
BH CV ECHO MEAS - MVA(VTI): 3 CM2
BH CV ECHO MEAS - PA ACC TIME: 0.18 SEC
BH CV ECHO MEAS - PA PR(ACCEL): -3.4 MMHG
BH CV ECHO MEAS - RAP SYSTOLE: 3 MMHG
BH CV ECHO MEAS - RVSP: 16 MMHG
BH CV ECHO MEAS - SI(MOD-SP2): 21.5 ML/M2
BH CV ECHO MEAS - SI(MOD-SP4): 20.4 ML/M2
BH CV ECHO MEAS - SV(LVOT): 61.6 ML
BH CV ECHO MEAS - SV(MOD-SP2): 47.9 ML
BH CV ECHO MEAS - SV(MOD-SP4): 45.5 ML
BH CV ECHO MEAS - TAPSE (>1.6): 1.88 CM
BH CV ECHO MEAS - TR MAX PG: 13.2 MMHG
BH CV ECHO MEAS - TR MAX VEL: 181.9 CM/SEC
BH CV ECHO MEASUREMENTS AVERAGE E/E' RATIO: 6.23
BH CV STRESS BP STAGE 1: NORMAL
BH CV STRESS BP STAGE 2: NORMAL
BH CV STRESS BP STAGE 3: NORMAL
BH CV STRESS DURATION MIN STAGE 1: 3
BH CV STRESS DURATION MIN STAGE 2: 3
BH CV STRESS DURATION MIN STAGE 3: 3
BH CV STRESS DURATION SEC STAGE 1: 0
BH CV STRESS DURATION SEC STAGE 2: 0
BH CV STRESS DURATION SEC STAGE 3: 0
BH CV STRESS GRADE STAGE 1: 10
BH CV STRESS GRADE STAGE 2: 12
BH CV STRESS GRADE STAGE 3: 14
BH CV STRESS HR STAGE 1: 126
BH CV STRESS HR STAGE 2: 157
BH CV STRESS HR STAGE 3: 184
BH CV STRESS METS STAGE 1: 5
BH CV STRESS METS STAGE 2: 7.5
BH CV STRESS METS STAGE 3: 10
BH CV STRESS O2 STAGE 1: 98
BH CV STRESS O2 STAGE 2: 98
BH CV STRESS O2 STAGE 3: 97
BH CV STRESS PROTOCOL 1: NORMAL
BH CV STRESS RECOVERY BP: NORMAL MMHG
BH CV STRESS RECOVERY HR: 105 BPM
BH CV STRESS RECOVERY O2: 97 %
BH CV STRESS SPEED STAGE 1: 1.7
BH CV STRESS SPEED STAGE 2: 2.5
BH CV STRESS SPEED STAGE 3: 3.4
BH CV STRESS STAGE 1: 1
BH CV STRESS STAGE 2: 2
BH CV STRESS STAGE 3: 3
BH CV XLRA - RV BASE: 4.7 CM
BH CV XLRA - RV LENGTH: 10.7 CM
BH CV XLRA - RV MID: 2.9 CM
BH CV XLRA - TDI S': 12.5 CM/SEC
LEFT ATRIUM VOLUME INDEX: 9.7 ML/M2
MAXIMAL PREDICTED HEART RATE: 196 BPM
MAXIMAL PREDICTED HEART RATE: 196 BPM
PERCENT MAX PREDICTED HR: 93.88 %
STRESS BASELINE BP: NORMAL MMHG
STRESS BASELINE HR: 75 BPM
STRESS O2 SAT REST: 98 %
STRESS PERCENT HR: 110 %
STRESS POST ESTIMATED WORKLOAD: 10.1 METS
STRESS POST EXERCISE DUR MIN: 9 MIN
STRESS POST EXERCISE DUR SEC: 0 SEC
STRESS POST O2 SAT PEAK: 97 %
STRESS POST PEAK BP: NORMAL MMHG
STRESS POST PEAK HR: 184 BPM
STRESS TARGET HR: 167 BPM
STRESS TARGET HR: 167 BPM

## 2023-05-01 PROCEDURE — 93018 CV STRESS TEST I&R ONLY: CPT | Performed by: INTERNAL MEDICINE

## 2023-05-01 PROCEDURE — 93306 TTE W/DOPPLER COMPLETE: CPT | Performed by: INTERNAL MEDICINE

## 2023-05-01 PROCEDURE — 93017 CV STRESS TEST TRACING ONLY: CPT

## 2023-05-01 PROCEDURE — 93306 TTE W/DOPPLER COMPLETE: CPT

## 2023-06-19 ENCOUNTER — OFFICE VISIT (OUTPATIENT)
Dept: INTERNAL MEDICINE | Facility: CLINIC | Age: 23
End: 2023-06-19
Payer: COMMERCIAL

## 2023-06-19 VITALS
BODY MASS INDEX: 28.49 KG/M2 | HEIGHT: 73 IN | TEMPERATURE: 96.9 F | SYSTOLIC BLOOD PRESSURE: 136 MMHG | WEIGHT: 215 LBS | DIASTOLIC BLOOD PRESSURE: 76 MMHG | OXYGEN SATURATION: 98 % | HEART RATE: 82 BPM

## 2023-06-19 DIAGNOSIS — Z00.00 ANNUAL PHYSICAL EXAM: Primary | ICD-10-CM

## 2023-06-19 LAB
DEPRECATED RDW RBC AUTO: 39.9 FL (ref 37–54)
ERYTHROCYTE [DISTWIDTH] IN BLOOD BY AUTOMATED COUNT: 12.3 % (ref 12.3–15.4)
HBA1C MFR BLD: 5.2 % (ref 4.8–5.6)
HCT VFR BLD AUTO: 45.9 % (ref 37.5–51)
HGB BLD-MCNC: 15.4 G/DL (ref 13–17.7)
MCH RBC QN AUTO: 29.6 PG (ref 26.6–33)
MCHC RBC AUTO-ENTMCNC: 33.6 G/DL (ref 31.5–35.7)
MCV RBC AUTO: 88.1 FL (ref 79–97)
PLATELET # BLD AUTO: 371 10*3/MM3 (ref 140–450)
PMV BLD AUTO: 10.1 FL (ref 6–12)
RBC # BLD AUTO: 5.21 10*6/MM3 (ref 4.14–5.8)
WBC NRBC COR # BLD: 9.57 10*3/MM3 (ref 3.4–10.8)

## 2023-06-19 PROCEDURE — 99395 PREV VISIT EST AGE 18-39: CPT | Performed by: INTERNAL MEDICINE

## 2023-06-19 PROCEDURE — 90715 TDAP VACCINE 7 YRS/> IM: CPT | Performed by: INTERNAL MEDICINE

## 2023-06-19 PROCEDURE — 90471 IMMUNIZATION ADMIN: CPT | Performed by: INTERNAL MEDICINE

## 2023-06-19 RX ORDER — FLUTICASONE PROPIONATE 50 MCG
2 SPRAY, SUSPENSION (ML) NASAL DAILY
COMMUNITY
Start: 2023-05-30

## 2023-06-19 RX ORDER — CHLORCYCLIZINE HYDROCHLORIDE AND PSEUDOEPHEDRINE HYDROCHLORIDE 25; 60 MG/1; MG/1
TABLET ORAL
COMMUNITY
Start: 2023-05-15

## 2023-06-19 RX ORDER — DEXTROAMPHETAMINE SACCHARATE, AMPHETAMINE ASPARTATE, DEXTROAMPHETAMINE SULFATE, AND AMPHETAMINE SULFATE 2.5; 2.5; 2.5; 2.5 MG/1; MG/1; MG/1; MG/1
TABLET ORAL
COMMUNITY
Start: 2023-04-04

## 2023-06-19 RX ORDER — OMEPRAZOLE 40 MG/1
CAPSULE, DELAYED RELEASE ORAL
COMMUNITY
Start: 2023-06-06

## 2023-06-19 NOTE — PROGRESS NOTES
"     Office Note      Date: 2023  Patient Name: Artur Dias Jr.  MRN: [id number]  : 2000    Chief Complaint   Patient presents with    Annual Exam       History of Present Illness: Artur Dias Jr. is a 22 y.o. male who presents for Annual Exam. Doing well today, has rt ear fullness.     Subjective      Review of Systems:   Pertinent review of systems per HPI.    Review of Systems   Constitutional:  Negative for activity change, appetite change, chills, diaphoresis and fatigue.   HENT:  Negative for congestion, dental problem, drooling, ear discharge, ear pain, facial swelling and hearing loss.    Eyes:  Negative for photophobia, pain, discharge, redness, itching and visual disturbance.   Respiratory:  Negative for cough, choking, chest tightness and shortness of breath.    Cardiovascular:  Negative for chest pain, palpitations and leg swelling.   Endocrine: Negative for cold intolerance, heat intolerance, polydipsia and polyuria.   Genitourinary:  Negative for difficulty urinating, dysuria, flank pain, frequency and hematuria.   Musculoskeletal:  Negative for arthralgias and back pain.   Skin:  Negative for color change, pallor, rash and wound.   Allergic/Immunologic: Negative for environmental allergies.   Neurological:  Negative for dizziness, facial asymmetry, light-headedness and headaches.   Psychiatric/Behavioral: Negative.     All other systems reviewed and are negative.  Allergies   Allergen Reactions    Bee Venom Unknown (See Comments)    Doxycycline Rash       Objective     Physical Exam:  Vital Signs:   Vitals:    23 0925   BP: 136/76   Pulse: 82   Temp: 96.9 °F (36.1 °C)   SpO2: 98%   Weight: 97.5 kg (215 lb)   Height: 185.4 cm (73\")   PainSc:   3   PainLoc: Hand  Comment: Bilateral      Body mass index is 28.37 kg/m².    Physical Exam  Vitals and nursing note reviewed.   Constitutional:       General: He is not in acute distress.     Appearance: He is well-developed. "   HENT:      Head: Normocephalic and atraumatic.      Right Ear: Tympanic membrane and external ear normal.      Left Ear: Tympanic membrane and external ear normal.   Cardiovascular:      Rate and Rhythm: Normal rate and regular rhythm.      Heart sounds: Normal heart sounds. No murmur heard.    No friction rub. No gallop.   Pulmonary:      Effort: Pulmonary effort is normal. No respiratory distress.      Breath sounds: Normal breath sounds. No wheezing or rales.   Skin:     General: Skin is warm and dry.      Coloration: Skin is not pale.       Assessment / Plan      Assessment & Plan:    1. Annual physical exam  Counseled on:  Mammo starting at age 40  Pap smear q5 years if normal pap cytology with neg HPV, otherwise q3 years  Colonoscopy at 44 y/o  PNA vaccinations starting at age 65  shingrix at age 50  Td/Tdap q 10 years  Wear seatbelt when driving  Flu shot annually  Advised flonase and allergy pill for ear fullness  - CBC (No Diff)  - Comprehensive Metabolic Panel  - Lipid Panel  - TSH Rfx On Abnormal To Free T4  - Vitamin B12  - Hemoglobin A1c      Kerry Guzman MD  06/19/2023

## 2023-06-20 LAB
ALBUMIN SERPL-MCNC: 4.7 G/DL (ref 3.5–5.2)
ALBUMIN/GLOB SERPL: 2.2 G/DL
ALP SERPL-CCNC: 62 U/L (ref 39–117)
ALT SERPL W P-5'-P-CCNC: 21 U/L (ref 1–41)
ANION GAP SERPL CALCULATED.3IONS-SCNC: 9 MMOL/L (ref 5–15)
AST SERPL-CCNC: 22 U/L (ref 1–40)
BILIRUB SERPL-MCNC: 0.6 MG/DL (ref 0–1.2)
BUN SERPL-MCNC: 9 MG/DL (ref 6–20)
BUN/CREAT SERPL: 10.3 (ref 7–25)
CALCIUM SPEC-SCNC: 9.8 MG/DL (ref 8.6–10.5)
CHLORIDE SERPL-SCNC: 103 MMOL/L (ref 98–107)
CHOLEST SERPL-MCNC: 121 MG/DL (ref 0–200)
CO2 SERPL-SCNC: 30 MMOL/L (ref 22–29)
CREAT SERPL-MCNC: 0.87 MG/DL (ref 0.76–1.27)
EGFRCR SERPLBLD CKD-EPI 2021: 123.6 ML/MIN/1.73
GLOBULIN UR ELPH-MCNC: 2.1 GM/DL
GLUCOSE SERPL-MCNC: 84 MG/DL (ref 65–99)
HDLC SERPL-MCNC: 41 MG/DL (ref 40–60)
LDLC SERPL CALC-MCNC: 64 MG/DL (ref 0–100)
LDLC/HDLC SERPL: 1.56 {RATIO}
POTASSIUM SERPL-SCNC: 4.4 MMOL/L (ref 3.5–5.2)
PROT SERPL-MCNC: 6.8 G/DL (ref 6–8.5)
SODIUM SERPL-SCNC: 142 MMOL/L (ref 136–145)
TRIGL SERPL-MCNC: 80 MG/DL (ref 0–150)
TSH SERPL DL<=0.05 MIU/L-ACNC: 1.52 UIU/ML (ref 0.27–4.2)
VIT B12 BLD-MCNC: 319 PG/ML (ref 211–946)
VLDLC SERPL-MCNC: 16 MG/DL (ref 5–40)

## 2023-07-24 ENCOUNTER — TELEPHONE (OUTPATIENT)
Dept: INTERNAL MEDICINE | Facility: CLINIC | Age: 23
End: 2023-07-24

## 2023-07-24 NOTE — TELEPHONE ENCOUNTER
Caller: Artur Dias Jr.    Relationship: Self    Best call back number: [phone]    What orders are you requesting (i.e. lab or imaging): ORDER FOR MRI OF NECK     Additional notes: THE PATIENT STATES THAT HE WAS IN AN ATV ACCIDENT 05/28/2023 AND INJURED HIS HEAD THE PATIENT STATES THAT HE IS HAVING BAD NECK PAIN HE STATES THAT HE HAS HAD CAT SCANS HE WOULD LIKE THE DOCTOR TO ORDER HIM AN MRI OF HIS NECK  PLEASE RESPOND ON MYCHART            [facility]   Intensive Care Unit Daily Note    Name: Sarika (Female-Katherine Nicholas) Francisco  Parents: Katherine Nicholas mother   YOB: 3/3.     PCPs:   Infant PCP: [facility]  Maternal OB PCP:   Information for the patient's mother:  Katherine Gómez [[id number]]   [address] [facility]   MFM: Dr. Hernandez  Delivering Provider:  Dr. Hardy  Brandy Vasquez MD

## 2023-07-25 ENCOUNTER — OFFICE VISIT (OUTPATIENT)
Dept: INTERNAL MEDICINE | Facility: CLINIC | Age: 23
End: 2023-07-25
Payer: COMMERCIAL

## 2023-07-25 VITALS
OXYGEN SATURATION: 98 % | HEIGHT: 73 IN | BODY MASS INDEX: 27.57 KG/M2 | TEMPERATURE: 98.1 F | WEIGHT: 208 LBS | HEART RATE: 78 BPM | SYSTOLIC BLOOD PRESSURE: 128 MMHG | RESPIRATION RATE: 16 BRPM | DIASTOLIC BLOOD PRESSURE: 76 MMHG

## 2023-07-25 DIAGNOSIS — M54.2 CERVICAL SPINE PAIN: Primary | ICD-10-CM

## 2023-07-25 PROCEDURE — 99213 OFFICE O/P EST LOW 20 MIN: CPT | Performed by: NURSE PRACTITIONER

## 2023-07-25 RX ORDER — NAPROXEN 500 MG/1
500 TABLET ORAL AS NEEDED
Qty: 30 TABLET | Refills: 1 | Status: SHIPPED | OUTPATIENT
Start: 2023-07-25
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
## 2023-08-17 ENCOUNTER — OFFICE VISIT (OUTPATIENT)
Dept: INTERNAL MEDICINE | Facility: CLINIC | Age: 23
End: 2023-08-17
Payer: COMMERCIAL

## 2023-08-17 VITALS
WEIGHT: 208.4 LBS | BODY MASS INDEX: 27.5 KG/M2 | OXYGEN SATURATION: 97 % | RESPIRATION RATE: 16 BRPM | TEMPERATURE: 98.4 F

## 2023-08-17 DIAGNOSIS — R21 RASH: ICD-10-CM

## 2023-08-17 DIAGNOSIS — R42 LIGHTHEADEDNESS: Primary | ICD-10-CM

## 2023-08-17 PROCEDURE — 99213 OFFICE O/P EST LOW 20 MIN: CPT | Performed by: NURSE PRACTITIONER

## 2023-08-17 RX ORDER — PIMECROLIMUS 10 MG/G
1 CREAM TOPICAL 2 TIMES DAILY
Qty: 30 G | Refills: 0 | Status: SHIPPED | OUTPATIENT
Start: 2023-08-17
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
## 2023-09-13 ENCOUNTER — OFFICE VISIT (OUTPATIENT)
Dept: INTERNAL MEDICINE | Facility: CLINIC | Age: 23
End: 2023-09-13
Payer: COMMERCIAL

## 2023-09-13 VITALS
HEART RATE: 94 BPM | SYSTOLIC BLOOD PRESSURE: 118 MMHG | TEMPERATURE: 97.1 F | WEIGHT: 203.2 LBS | OXYGEN SATURATION: 97 % | RESPIRATION RATE: 18 BRPM | DIASTOLIC BLOOD PRESSURE: 66 MMHG | HEIGHT: 72 IN | BODY MASS INDEX: 27.52 KG/M2

## 2023-09-13 DIAGNOSIS — F43.10 PTSD (POST-TRAUMATIC STRESS DISORDER): ICD-10-CM

## 2023-09-13 DIAGNOSIS — R30.0 DYSURIA: Primary | ICD-10-CM

## 2023-09-13 DIAGNOSIS — M54.2 CERVICAL SPINE PAIN: ICD-10-CM

## 2023-09-13 DIAGNOSIS — M51.36 DEGENERATIVE DISC DISEASE, LUMBAR: ICD-10-CM

## 2023-09-13 DIAGNOSIS — F17.200 NICOTINE DEPENDENCE WITH CURRENT USE: ICD-10-CM

## 2023-09-13 LAB
BILIRUB BLD-MCNC: NEGATIVE MG/DL
CLARITY, POC: CLEAR
COLOR UR: YELLOW
EXPIRATION DATE: NORMAL
GLUCOSE UR STRIP-MCNC: NEGATIVE MG/DL
KETONES UR QL: NEGATIVE
LEUKOCYTE EST, POC: NEGATIVE
Lab: NORMAL
NITRITE UR-MCNC: NEGATIVE MG/ML
PH UR: 6 [PH] (ref 5–8)
PROT UR STRIP-MCNC: NEGATIVE MG/DL
RBC # UR STRIP: NEGATIVE /UL
SP GR UR: 1.01 (ref 1–1.03)
UROBILINOGEN UR QL: NORMAL

## 2023-09-13 PROCEDURE — 81003 URINALYSIS AUTO W/O SCOPE: CPT

## 2023-09-13 PROCEDURE — 99214 OFFICE O/P EST MOD 30 MIN: CPT

## 2023-09-13 PROCEDURE — 87086 URINE CULTURE/COLONY COUNT: CPT

## 2023-09-13 RX ORDER — NAPROXEN 500 MG/1
500 TABLET ORAL AS NEEDED
Qty: 30 TABLET | Refills: 1 | Status: SHIPPED | OUTPATIENT
Start: 2023-09-13

## 2023-09-13 RX ORDER — DEXTROAMPHETAMINE SACCHARATE, AMPHETAMINE ASPARTATE, DEXTROAMPHETAMINE SULFATE AND AMPHETAMINE SULFATE 5; 5; 5; 5 MG/1; MG/1; MG/1; MG/1
1 TABLET ORAL EVERY 12 HOURS SCHEDULED
COMMUNITY
Start: 2023-08-31

## 2023-09-13 RX ORDER — NICOTINE 21 MG/24HR
1 PATCH, TRANSDERMAL 24 HOURS TRANSDERMAL EVERY 24 HOURS
Qty: 14 EACH | Refills: 2 | Status: SHIPPED | OUTPATIENT
Start: 2023-09-13
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
## 2023-09-14 LAB — BACTERIA SPEC AEROBE CULT: NO GROWTH

## 2023-09-15 ENCOUNTER — HOSPITAL ENCOUNTER (OUTPATIENT)
Dept: MRI IMAGING | Facility: HOSPITAL | Age: 23
Discharge: HOME | End: 2023-09-15
Admitting: NURSE PRACTITIONER
Payer: COMMERCIAL

## 2023-09-15 DIAGNOSIS — M54.2 CERVICAL SPINE PAIN: ICD-10-CM

## 2023-09-15 PROCEDURE — 72141 MRI NECK SPINE W/O DYE: CPT

## 2023-12-04 ENCOUNTER — TELEPHONE (OUTPATIENT)
Dept: INTERNAL MEDICINE | Facility: CLINIC | Age: 23
End: 2023-12-04
Payer: COMMERCIAL

## 2023-12-04 NOTE — TELEPHONE ENCOUNTER
Artur called wanting to discuss his results with someone. I told him someone from the clinical staff would call him back to discuss when they can.

## 2023-12-05 ENCOUNTER — OFFICE VISIT (OUTPATIENT)
Dept: INTERNAL MEDICINE | Facility: CLINIC | Age: 23
End: 2023-12-05
Payer: COMMERCIAL

## 2023-12-05 VITALS
HEIGHT: 72 IN | BODY MASS INDEX: 28.17 KG/M2 | WEIGHT: 208 LBS | OXYGEN SATURATION: 100 % | SYSTOLIC BLOOD PRESSURE: 120 MMHG | DIASTOLIC BLOOD PRESSURE: 74 MMHG | TEMPERATURE: 98 F | HEART RATE: 68 BPM

## 2023-12-05 DIAGNOSIS — R93.89 ABNORMAL MRI, NECK: Primary | ICD-10-CM

## 2023-12-05 DIAGNOSIS — M54.10 RADICULOPATHY AFFECTING UPPER EXTREMITY: ICD-10-CM

## 2023-12-05 DIAGNOSIS — M54.16 LUMBAR BACK PAIN WITH RADICULOPATHY AFFECTING LOWER EXTREMITY: ICD-10-CM

## 2023-12-05 PROCEDURE — 99213 OFFICE O/P EST LOW 20 MIN: CPT

## 2023-12-05 RX ORDER — NICOTINE 21 MG/24HR
1 PATCH, TRANSDERMAL 24 HOURS TRANSDERMAL EVERY 24 HOURS
Qty: 14 EACH | Refills: 2 | Status: CANCELLED | OUTPATIENT
Start: 2023-12-05
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
## 2023-12-05 NOTE — TELEPHONE ENCOUNTER
Patient has been notified of Farzana's message.  He has made an appointment today to see her.  He has changed jobs and can make appointments.

## 2023-12-21 ENCOUNTER — HOSPITAL ENCOUNTER (EMERGENCY)
Facility: HOSPITAL | Age: 23
Discharge: HOME | End: 2023-12-22
Attending: EMERGENCY MEDICINE
Payer: COMMERCIAL

## 2023-12-21 ENCOUNTER — APPOINTMENT (OUTPATIENT)
Dept: GENERAL RADIOLOGY | Facility: HOSPITAL | Age: 23
End: 2023-12-21
Payer: COMMERCIAL

## 2023-12-21 DIAGNOSIS — F32.A ANXIETY AND DEPRESSION: ICD-10-CM

## 2023-12-21 DIAGNOSIS — R53.81 MALAISE AND FATIGUE: ICD-10-CM

## 2023-12-21 DIAGNOSIS — Z86.59 HISTORY OF BIPOLAR DISORDER: ICD-10-CM

## 2023-12-21 DIAGNOSIS — Z86.59 HISTORY OF ADHD: ICD-10-CM

## 2023-12-21 DIAGNOSIS — R51.9 GENERALIZED HEADACHE: ICD-10-CM

## 2023-12-21 DIAGNOSIS — F41.9 ANXIETY AND DEPRESSION: ICD-10-CM

## 2023-12-21 DIAGNOSIS — R00.2 PALPITATIONS: ICD-10-CM

## 2023-12-21 DIAGNOSIS — R53.83 MALAISE AND FATIGUE: ICD-10-CM

## 2023-12-21 DIAGNOSIS — R07.89 ATYPICAL CHEST PAIN: Primary | ICD-10-CM

## 2023-12-21 DIAGNOSIS — R42 DIZZINESS: ICD-10-CM

## 2023-12-21 LAB
ALBUMIN SERPL-MCNC: 4.6 G/DL (ref 3.5–5.2)
ALBUMIN/GLOB SERPL: 2.4 G/DL
ALP SERPL-CCNC: 72 U/L (ref 39–117)
ALT SERPL W P-5'-P-CCNC: 26 U/L (ref 1–41)
AMPHET+METHAMPHET UR QL: NEGATIVE
AMPHETAMINES UR QL: NEGATIVE
ANION GAP SERPL CALCULATED.3IONS-SCNC: 10 MMOL/L (ref 5–15)
AST SERPL-CCNC: 20 U/L (ref 1–40)
BARBITURATES UR QL SCN: NEGATIVE
BASOPHILS # BLD AUTO: 0.07 10*3/MM3 (ref 0–0.2)
BASOPHILS NFR BLD AUTO: 0.7 % (ref 0–1.5)
BENZODIAZ UR QL SCN: NEGATIVE
BILIRUB SERPL-MCNC: 0.3 MG/DL (ref 0–1.2)
BUN SERPL-MCNC: 10 MG/DL (ref 6–20)
BUN/CREAT SERPL: 10.6 (ref 7–25)
BUPRENORPHINE SERPL-MCNC: NEGATIVE NG/ML
CALCIUM SPEC-SCNC: 9.5 MG/DL (ref 8.6–10.5)
CANNABINOIDS SERPL QL: NEGATIVE
CHLORIDE SERPL-SCNC: 101 MMOL/L (ref 98–107)
CO2 SERPL-SCNC: 28 MMOL/L (ref 22–29)
COCAINE UR QL: NEGATIVE
CREAT SERPL-MCNC: 0.94 MG/DL (ref 0.76–1.27)
DEPRECATED RDW RBC AUTO: 42.1 FL (ref 37–54)
EGFRCR SERPLBLD CKD-EPI 2021: 115.4 ML/MIN/1.73
EOSINOPHIL # BLD AUTO: 0.26 10*3/MM3 (ref 0–0.4)
EOSINOPHIL NFR BLD AUTO: 2.5 % (ref 0.3–6.2)
ERYTHROCYTE [DISTWIDTH] IN BLOOD BY AUTOMATED COUNT: 12.8 % (ref 12.3–15.4)
FENTANYL UR-MCNC: NEGATIVE NG/ML
GEN 5 2HR TROPONIN T REFLEX: 7 NG/L
GLOBULIN UR ELPH-MCNC: 1.9 GM/DL
GLUCOSE SERPL-MCNC: 100 MG/DL (ref 65–99)
HCT VFR BLD AUTO: 46.4 % (ref 37.5–51)
HGB BLD-MCNC: 15.2 G/DL (ref 13–17.7)
HOLD SPECIMEN: NORMAL
HOLD SPECIMEN: NORMAL
IMM GRANULOCYTES # BLD AUTO: 0.04 10*3/MM3 (ref 0–0.05)
IMM GRANULOCYTES NFR BLD AUTO: 0.4 % (ref 0–0.5)
LIPASE SERPL-CCNC: 23 U/L (ref 13–60)
LYMPHOCYTES # BLD AUTO: 2.49 10*3/MM3 (ref 0.7–3.1)
LYMPHOCYTES NFR BLD AUTO: 24.4 % (ref 19.6–45.3)
MCH RBC QN AUTO: 29.6 PG (ref 26.6–33)
MCHC RBC AUTO-ENTMCNC: 32.8 G/DL (ref 31.5–35.7)
MCV RBC AUTO: 90.3 FL (ref 79–97)
METHADONE UR QL SCN: NEGATIVE
MONOCYTES # BLD AUTO: 0.8 10*3/MM3 (ref 0.1–0.9)
MONOCYTES NFR BLD AUTO: 7.8 % (ref 5–12)
NEUTROPHILS NFR BLD AUTO: 6.54 10*3/MM3 (ref 1.7–7)
NEUTROPHILS NFR BLD AUTO: 64.2 % (ref 42.7–76)
NRBC BLD AUTO-RTO: 0 /100 WBC (ref 0–0.2)
NT-PROBNP SERPL-MCNC: <36 PG/ML (ref 0–450)
OPIATES UR QL: NEGATIVE
OXYCODONE UR QL SCN: NEGATIVE
PCP UR QL SCN: NEGATIVE
PLATELET # BLD AUTO: 373 10*3/MM3 (ref 140–450)
PMV BLD AUTO: 9.9 FL (ref 6–12)
POTASSIUM SERPL-SCNC: 4.6 MMOL/L (ref 3.5–5.2)
PROT SERPL-MCNC: 6.5 G/DL (ref 6–8.5)
RBC # BLD AUTO: 5.14 10*6/MM3 (ref 4.14–5.8)
SODIUM SERPL-SCNC: 139 MMOL/L (ref 136–145)
T4 FREE SERPL-MCNC: 1.16 NG/DL (ref 0.93–1.7)
TRICYCLICS UR QL SCN: NEGATIVE
TROPONIN T DELTA: NORMAL
TROPONIN T SERPL HS-MCNC: <6 NG/L
TSH SERPL DL<=0.05 MIU/L-ACNC: 1.32 UIU/ML (ref 0.27–4.2)
WBC NRBC COR # BLD AUTO: 10.2 10*3/MM3 (ref 3.4–10.8)
WHOLE BLOOD HOLD COAG: NORMAL
WHOLE BLOOD HOLD SPECIMEN: NORMAL

## 2023-12-21 PROCEDURE — 84484 ASSAY OF TROPONIN QUANT: CPT | Performed by: EMERGENCY MEDICINE

## 2023-12-21 PROCEDURE — 71045 X-RAY EXAM CHEST 1 VIEW: CPT

## 2023-12-21 PROCEDURE — 83690 ASSAY OF LIPASE: CPT | Performed by: EMERGENCY MEDICINE

## 2023-12-21 PROCEDURE — 84439 ASSAY OF FREE THYROXINE: CPT | Performed by: PHYSICIAN ASSISTANT

## 2023-12-21 PROCEDURE — 80050 GENERAL HEALTH PANEL: CPT

## 2023-12-21 PROCEDURE — 83880 ASSAY OF NATRIURETIC PEPTIDE: CPT | Performed by: EMERGENCY MEDICINE

## 2023-12-21 PROCEDURE — 99284 EMERGENCY DEPT VISIT MOD MDM: CPT

## 2023-12-21 PROCEDURE — 80307 DRUG TEST PRSMV CHEM ANLYZR: CPT | Performed by: PHYSICIAN ASSISTANT

## 2023-12-21 PROCEDURE — 36415 COLL VENOUS BLD VENIPUNCTURE: CPT

## 2023-12-21 PROCEDURE — 93005 ELECTROCARDIOGRAM TRACING: CPT

## 2023-12-21 PROCEDURE — 93005 ELECTROCARDIOGRAM TRACING: CPT | Performed by: EMERGENCY MEDICINE

## 2023-12-21 RX ORDER — ASPIRIN 81 MG/1
324 TABLET, CHEWABLE ORAL ONCE
Status: COMPLETED | OUTPATIENT
Start: 2023-12-21 | End: 2023-12-21

## 2023-12-21 RX ORDER — SODIUM CHLORIDE 0.9 % (FLUSH) 0.9 %
10 SYRINGE (ML) INJECTION AS NEEDED
Status: DISCONTINUED | OUTPATIENT
Start: 2023-12-21 | End: 2023-12-22 | Stop reason: HOSPADM

## 2023-12-21 RX ADMIN — ASPIRIN 81 MG CHEWABLE TABLET 324 MG: 81 TABLET CHEWABLE at 20:32

## 2023-12-22 ENCOUNTER — TELEPHONE (OUTPATIENT)
Dept: CARDIOLOGY | Facility: CLINIC | Age: 23
End: 2023-12-22
Payer: COMMERCIAL

## 2023-12-22 VITALS
OXYGEN SATURATION: 98 % | TEMPERATURE: 98.2 F | SYSTOLIC BLOOD PRESSURE: 118 MMHG | DIASTOLIC BLOOD PRESSURE: 66 MMHG | BODY MASS INDEX: 27.43 KG/M2 | HEIGHT: 73 IN | RESPIRATION RATE: 18 BRPM | WEIGHT: 207 LBS | HEART RATE: 78 BPM

## 2023-12-22 LAB
HOLD SPECIMEN: NORMAL
QT INTERVAL: 308 MS
QT INTERVAL: 362 MS
QTC INTERVAL: 387 MS
QTC INTERVAL: 399 MS

## 2023-12-22 NOTE — ED PROVIDER NOTES
Subjective   History of Present Illness  This is a 23-year-old male that presents the ER with 3-day history of waxing and waning palpitations.  Patient also says that he has had some intermittent sharp left-sided chest pain, headache, malaise/fatigue.  He denies any recent rhinorrhea, nasal congestion, or cough.  Patient has felt flushed and dizzy at times.  He denies any near-syncope or syncope.  Patient says that he looked on his Apple Watch and his heart rate went up to 150 and 190.  His heart rate is 109 upon arrival to the ER.  Patient denies any lower extremity pedal edema.  He says that he has had palpitations for at least 3 years.  He has followed up with the cardiology clinic with Jake Vera and had a normal echocardiogram and treadmill stress test on 5/1/2023.  Echo showed EF of 55% and there was no valvular abnormality.  Patient also had a normal Holter monitor on 4/5/2023.  He has never been diagnosed with SVT or atrial fibrillation.  He used to drink quite a bit of caffeine but he has cut out the caffeine in his diet.  He also vapes, but he has tried to use nonnicotine vaping products.  Past medical history is significant for osteoarthritis, carpal tunnel syndrome, anxiety, bipolar 1 disorder, ADHD, depression, and degenerative disc disease of the lumbar spine.  No other concerns at this time.    History provided by:  Patient  Palpitations  Palpitations quality:  Fast  Duration:  3 days  Timing:  Intermittent  Progression:  Waxing and waning  Chronicity:  Chronic (Palpitations x 3 years.)  Context: nicotine (trying to use non-nicotine for vaping.)    Context: not caffeine    Context comment:  3 day h/o palpitations, sharp left sided CP, HA, malaisea/fatigue. No URI sxs. Pt feels flushed and dizzy at times. No near sycope/syncope.  Relieved by:  Nothing  Worsened by:  Nothing  Ineffective treatments:  None tried  Associated symptoms: chest pain (transient sharp left sided CP x 3 days without  radiation), dizziness and malaise/fatigue    Associated symptoms: no back pain, no chest pressure, no cough, no diaphoresis, no leg pain, no lower extremity edema, no nausea, no near-syncope, no numbness, no orthopnea, no PND, no shortness of breath, no syncope, no vomiting and no weakness    Risk factors: no hx of PE and no OTC sinus medications    Risk factors comment:  History of palpitations for 3 years.  Patient has had benign cardiac workup in April and May 2023 and has established with a cardiac event monitor clinic.  He denies known history of SVT or atrial fibrillation or other cardiac arrhythmia.      Review of Systems   Constitutional:  Positive for activity change, fatigue and malaise/fatigue. Negative for appetite change, chills, diaphoresis and fever.   HENT: Negative.  Negative for congestion, ear pain, postnasal drip, rhinorrhea, sinus pressure, sinus pain, sneezing and sore throat.    Respiratory: Negative.  Negative for cough, chest tightness, shortness of breath and wheezing.         Pt vapes, but he has been trying to use non-nicotine products recently.     Cardiovascular:  Positive for chest pain (transient sharp left sided CP x 3 days without radiation) and palpitations (3 year h/o palpitations. No h/o SVT or A Fib. Pt has followed with Cards with normal Holter in 4/23 and normal Echo and stress test on 5/1/23.). Negative for orthopnea, leg swelling, syncope, PND and near-syncope.   Gastrointestinal: Negative.  Negative for abdominal pain, constipation, diarrhea, nausea and vomiting.   Genitourinary: Negative.  Negative for dysuria, flank pain, frequency and urgency.   Musculoskeletal: Negative.  Negative for back pain.   Neurological:  Positive for dizziness and headaches (mild generalized HA). Negative for syncope, weakness and numbness.   Psychiatric/Behavioral:  Positive for behavioral problems (History of bipolar 1 disorder, anxiety/depression and ADHD).    All other systems reviewed and  are negative.      Past Medical History:   Diagnosis Date    ADHD (attention deficit hyperactivity disorder)     Dr. Chong Tellez    Anxiety 2013    Treated as a teenager until i became an adult and stopped gettubg seen    Arthritis     Bipolar 1 disorder     Carpal tunnel syndrome of right wrist 2023    DDD (degenerative disc disease), lumbar     Depression 2013    Headache     History of medical problems     Hsv-2, Degeneration Disc Disease, Irregular Heartbeat, Removed Gall Bladder    HSV (herpes simplex virus) anogenital infection     Low back pain        Allergies   Allergen Reactions    Bee Venom Unknown (See Comments)    Doxycycline Rash       Past Surgical History:   Procedure Laterality Date    CHOLECYSTECTOMY  2019    COLONOSCOPY         Family History   Problem Relation Age of Onset    Liver disease Mother     Mental illness Mother     Alcohol abuse Mother     Anxiety disorder Mother     Cancer Mother         Ovarian cancer while pregnant with me    Heart attack Father     No Known Problems Sister     No Known Problems Sister     Diabetes Maternal Grandfather         Congestive Heart Failure       Social History     Socioeconomic History    Marital status: Single   Tobacco Use    Smoking status: Some Days     Packs/day: 0.50     Years: 10.00     Additional pack years: 0.00     Total pack years: 5.00     Types: Cigarettes     Start date: 2012     Last attempt to quit: 2023     Years since quittin.3    Smokeless tobacco: Never    Tobacco comments:     Trying to quit by using ZYN   Vaping Use    Vaping Use: Never used   Substance and Sexual Activity    Alcohol use: Yes     Comment: Social drinker like once a month maybe 4 beers    Drug use: Never    Sexual activity: Yes     Partners: Female           Objective   Physical Exam  Vitals and nursing note reviewed.   Constitutional:       General: He is not in acute distress.     Appearance: Normal appearance. He is not  ill-appearing, toxic-appearing or diaphoretic.      Comments: Friendly and talkative.  Normal mood and affect.  No acute sign of pain or distress.  Nontoxic.   HENT:      Head: Normocephalic and atraumatic.      Nose: Nose normal.      Mouth/Throat:      Mouth: Mucous membranes are moist.      Pharynx: Oropharynx is clear.   Eyes:      Extraocular Movements: Extraocular movements intact.      Conjunctiva/sclera: Conjunctivae normal.      Pupils: Pupils are equal, round, and reactive to light.   Neck:      Thyroid: No thyromegaly.      Comments: No thyromegaly  Cardiovascular:      Rate and Rhythm: Normal rate and regular rhythm. No extrasystoles are present.     Pulses: Normal pulses.           Dorsalis pedis pulses are 2+ on the right side and 2+ on the left side.        Posterior tibial pulses are 2+ on the right side and 2+ on the left side.      Heart sounds: Normal heart sounds. No murmur heard.     Comments: Regular rate and rhythm.  No tachycardia or ectopy.  No murmurs appreciated.  No pedal edema to lower extremities  Pulmonary:      Effort: Pulmonary effort is normal. No tachypnea or accessory muscle usage.      Breath sounds: Normal breath sounds. No decreased breath sounds, wheezing, rhonchi or rales.      Comments: Lungs are clear to auscultation bilaterally.  Good air exchange to bilateral lung fields.  No pleuritic type chest pain elicited to the left lung fields with deep inspiration.  Chest:      Chest wall: No deformity, swelling, tenderness or crepitus.      Comments: No chest wall tenderness  Abdominal:      General: Bowel sounds are normal. There is no distension.      Palpations: Abdomen is soft.      Tenderness: There is no abdominal tenderness. There is no right CVA tenderness, left CVA tenderness, guarding or rebound.      Comments: Abdomen soft and nontender.  No flank or CVA tenderness   Musculoskeletal:         General: Normal range of motion.      Cervical back: Normal range of motion  and neck supple.      Right lower leg: No edema.      Left lower leg: No edema.   Skin:     General: Skin is warm and dry.      Findings: No lesion or rash.      Comments: No skin lesions or rash noted to the trunk   Neurological:      General: No focal deficit present.      Mental Status: He is alert and oriented to person, place, and time.      Cranial Nerves: Cranial nerves 2-12 are intact.      Sensory: Sensation is intact.      Motor: Motor function is intact.      Coordination: Coordination is intact.      Comments: Neuro intact and nonfocal   Psychiatric:         Mood and Affect: Mood and affect normal.         Speech: Speech normal.         Behavior: Behavior normal. Behavior is cooperative.         Thought Content: Thought content normal.         Cognition and Memory: Cognition normal.         Judgment: Judgment normal.      Comments: Pleasant talkative.  Normal mood and affect.  Patient does not appear anxious on exam         Procedures           ED Course  ED Course as of 12/22/23 0031   Fri Dec 22, 2023   0019 I personally interpreted EKGs x 2 which showed sinus rhythm.  No evidence of ectopy, arrhythmia, or ischemia.  I also interpreted chest x-ray which revealed no acute cardiopulmonary process.  CBC and chemistries were within normal limits.  TSH and free T4 were also normal.  High-sensitivity troponins x 2 sets were less than 6 and 7 respectively.  UDS is completely negative.  Telemetry has been stable throughout the entire ER course.  Initial heart rate was 109 and telemetry has shown heart rate in the 70s for majority of the ER visit.  Patient given full-strength aspirin.  With history of palpitations that has been ongoing x 3 years, we will refer patient back to the cardiac event monitor clinic with Jake Vera.  Recommend patient to avoid all caffeine and try to decrease nicotine vaping products.  Encourage fluids.  Return to the ER if worsening symptoms.  ER workup is reassuring. [FC]      ED  Course User Index  [FC] Blank Alvarado PA-C                HEART Score: 1                    Recent Results (from the past 24 hour(s))   High Sensitivity Troponin T    Collection Time: 12/21/23  8:28 PM    Specimen: Blood   Result Value Ref Range    HS Troponin T <6 <22 ng/L   Comprehensive Metabolic Panel    Collection Time: 12/21/23  8:28 PM    Specimen: Blood   Result Value Ref Range    Glucose 100 (H) 65 - 99 mg/dL    BUN 10 6 - 20 mg/dL    Creatinine 0.94 0.76 - 1.27 mg/dL    Sodium 139 136 - 145 mmol/L    Potassium 4.6 3.5 - 5.2 mmol/L    Chloride 101 98 - 107 mmol/L    CO2 28.0 22.0 - 29.0 mmol/L    Calcium 9.5 8.6 - 10.5 mg/dL    Total Protein 6.5 6.0 - 8.5 g/dL    Albumin 4.6 3.5 - 5.2 g/dL    ALT (SGPT) 26 1 - 41 U/L    AST (SGOT) 20 1 - 40 U/L    Alkaline Phosphatase 72 39 - 117 U/L    Total Bilirubin 0.3 0.0 - 1.2 mg/dL    Globulin 1.9 gm/dL    A/G Ratio 2.4 g/dL    BUN/Creatinine Ratio 10.6 7.0 - 25.0    Anion Gap 10.0 5.0 - 15.0 mmol/L    eGFR 115.4 >60.0 mL/min/1.73   Lipase    Collection Time: 12/21/23  8:28 PM    Specimen: Blood   Result Value Ref Range    Lipase 23 13 - 60 U/L   BNP    Collection Time: 12/21/23  8:28 PM    Specimen: Blood   Result Value Ref Range    proBNP <36.0 0.0 - 450.0 pg/mL   Green Top (Gel)    Collection Time: 12/21/23  8:28 PM   Result Value Ref Range    Extra Tube Hold for add-ons.    Lavender Top    Collection Time: 12/21/23  8:28 PM   Result Value Ref Range    Extra Tube hold for add-on    Gold Top - SST    Collection Time: 12/21/23  8:28 PM   Result Value Ref Range    Extra Tube Hold for add-ons.    Gray Top    Collection Time: 12/21/23  8:28 PM   Result Value Ref Range    Extra Tube Hold for add-ons.    Light Blue Top    Collection Time: 12/21/23  8:28 PM   Result Value Ref Range    Extra Tube Hold for add-ons.    CBC Auto Differential    Collection Time: 12/21/23  8:28 PM    Specimen: Blood   Result Value Ref Range    WBC 10.20 3.40 - 10.80 10*3/mm3    RBC 5.14 4.14  - 5.80 10*6/mm3    Hemoglobin 15.2 13.0 - 17.7 g/dL    Hematocrit 46.4 37.5 - 51.0 %    MCV 90.3 79.0 - 97.0 fL    MCH 29.6 26.6 - 33.0 pg    MCHC 32.8 31.5 - 35.7 g/dL    RDW 12.8 12.3 - 15.4 %    RDW-SD 42.1 37.0 - 54.0 fl    MPV 9.9 6.0 - 12.0 fL    Platelets 373 140 - 450 10*3/mm3    Neutrophil % 64.2 42.7 - 76.0 %    Lymphocyte % 24.4 19.6 - 45.3 %    Monocyte % 7.8 5.0 - 12.0 %    Eosinophil % 2.5 0.3 - 6.2 %    Basophil % 0.7 0.0 - 1.5 %    Immature Grans % 0.4 0.0 - 0.5 %    Neutrophils, Absolute 6.54 1.70 - 7.00 10*3/mm3    Lymphocytes, Absolute 2.49 0.70 - 3.10 10*3/mm3    Monocytes, Absolute 0.80 0.10 - 0.90 10*3/mm3    Eosinophils, Absolute 0.26 0.00 - 0.40 10*3/mm3    Basophils, Absolute 0.07 0.00 - 0.20 10*3/mm3    Immature Grans, Absolute 0.04 0.00 - 0.05 10*3/mm3    nRBC 0.0 0.0 - 0.2 /100 WBC   T4, Free    Collection Time: 12/21/23  8:28 PM    Specimen: Blood   Result Value Ref Range    Free T4 1.16 0.93 - 1.70 ng/dL   TSH    Collection Time: 12/21/23  8:28 PM    Specimen: Blood   Result Value Ref Range    TSH 1.320 0.270 - 4.200 uIU/mL   ECG 12 Lead ED Triage Standing Order; Chest Pain    Collection Time: 12/21/23  8:28 PM   Result Value Ref Range    QT Interval 308 ms    QTC Interval 399 ms   Urine Drug Screen - Urine, Clean Catch    Collection Time: 12/21/23  9:12 PM    Specimen: Urine, Clean Catch   Result Value Ref Range    THC, Screen, Urine Negative Negative    Phencyclidine (PCP), Urine Negative Negative    Cocaine Screen, Urine Negative Negative    Methamphetamine, Ur Negative Negative    Opiate Screen Negative Negative    Amphetamine Screen, Urine Negative Negative    Benzodiazepine Screen, Urine Negative Negative    Tricyclic Antidepressants Screen Negative Negative    Methadone Screen, Urine Negative Negative    Barbiturates Screen, Urine Negative Negative    Oxycodone Screen, Urine Negative Negative    Buprenorphine, Screen, Urine Negative Negative   Fentanyl, Urine - Urine, Clean Catch  "   Collection Time: 12/21/23  9:12 PM    Specimen: Urine, Clean Catch   Result Value Ref Range    Fentanyl, Urine Negative Negative   ECG 12 Lead ED Triage Standing Order; Chest Pain    Collection Time: 12/21/23 10:45 PM   Result Value Ref Range    QT Interval 362 ms    QTC Interval 387 ms   High Sensitivity Troponin T 2Hr    Collection Time: 12/21/23 10:47 PM    Specimen: Blood   Result Value Ref Range    HS Troponin T 7 <22 ng/L    Troponin T Delta       Note: In addition to lab results from this visit, the labs listed above may include labs taken at another facility or during a different encounter within the last 24 hours. Please correlate lab times with ED admission and discharge times for further clarification of the services performed during this visit.    XR Chest 1 View   Final Result   Impression:   No evidence of active chest disease.         Electronically Signed: Jakob Pradhan MD     12/21/2023 9:39 PM EST     Workstation ID: [id number]        Vitals:    12/21/23 2019 12/21/23 2115 12/22/23 0004   BP: 140/84 140/76 118/66   BP Location: Left arm     Patient Position: Sitting     Pulse: 109 73 78   Resp: 18     Temp: 98.2 °F (36.8 °C)     TempSrc: Oral     SpO2: 99% 97% 98%   Weight: 93.9 kg (207 lb)     Height: 185.4 cm (73\")       Medications   sodium chloride 0.9 % flush 10 mL (has no administration in time range)   aspirin chewable tablet 324 mg (324 mg Oral Given 12/21/23 2032)     ECG/EMG Results (last 24 hours)       Procedure Component Value Units Date/Time    ECG 12 Lead ED Triage Standing Order; Chest Pain [[id number]] Collected: 12/21/23 2028     Updated: 12/21/23 2025     QT Interval 308 ms      QTC Interval 399 ms     Narrative:      Test Reason : ED Triage Standing Order~  Blood Pressure :   */*   mmHG  Vent. Rate : 101 BPM     Atrial Rate : 101 BPM     P-R Int : 150 ms          QRS Dur :  92 ms      QT Int : 308 ms       P-R-T Axes :  62  74  29 degrees     QTc Int : 399 ms    Sinus " tachycardia  Nonspecific T wave abnormality  Abnormal ECG  No previous ECGs available    Referred By: ED MD           Confirmed By:           ECG 12 Lead ED Triage Standing Order; Chest Pain   Preliminary Result   Test Reason : ED Triage Standing Order~   Blood Pressure :   */*   mmHG   Vent. Rate :  69 BPM     Atrial Rate :  69 BPM      P-R Int : 156 ms          QRS Dur :  98 ms       QT Int : 362 ms       P-R-T Axes : -18  80  27 degrees      QTc Int : 387 ms      Normal sinus rhythm   Nonspecific ST abnormality   Abnormal ECG   When compared with ECG of 21-DEC-2023 20:28, (Unconfirmed)   Nonspecific T wave abnormality no longer evident in Lateral leads      Referred By: EDMD           Confirmed By:       ECG 12 Lead ED Triage Standing Order; Chest Pain   Preliminary Result   Test Reason : ED Triage Standing Order~   Blood Pressure :   */*   mmHG   Vent. Rate : 101 BPM     Atrial Rate : 101 BPM      P-R Int : 150 ms          QRS Dur :  92 ms       QT Int : 308 ms       P-R-T Axes :  62  74  29 degrees      QTc Int : 399 ms      Sinus tachycardia   Nonspecific T wave abnormality   Abnormal ECG   No previous ECGs available      Referred By: ED MD           Confirmed By:              HEART Score for Major Cardiac Events - MDCalc  Calculated on Dec 22 2023 12:26 AM  1 points -> Low Score (0-3 points) Risk of MACE of 0.9-1.7%.       Medical Decision Making  Amount and/or Complexity of Data Reviewed  Labs: ordered.  Radiology: ordered.  ECG/medicine tests: ordered.    Risk  OTC drugs.  Prescription drug management.        Final diagnoses:   Atypical chest pain   Palpitations   Dizziness   Malaise and fatigue   Generalized headache   History of bipolar disorder   History of ADHD   Anxiety and depression       ED Disposition  ED Disposition       ED Disposition   Discharge    Condition   Stable    Comment   --               [facility] CARDIOLOGY  [address]  [address]  [facility]  [address]  [phone]  Call in 1 day  Call tomorrow for close f/u    [facility] EMERGENCY DEPARTMENT  [address]  [facility] [address] [phone]    If symptoms worsen         Medication List      No changes were made to your prescriptions during this visit.            Blank Alvarado PA-C  12/22/23 0031

## 2023-12-22 NOTE — TELEPHONE ENCOUNTER
Notified patient that the ER would need to provide a work note. Patient was not seen in the office.

## 2023-12-22 NOTE — DISCHARGE INSTRUCTIONS
ER work-up is reassuring.  EKGs x 2 were within normal limits and troponins x 2 sets were also normal.  Thyroid studies were within normal limits.  Chest x-ray showed no acute process.  CBC and chemistries were also completely normal.  Avoid caffeine and increase water intake. Avoid nicotine products.  Recommend patient to contact well Suddeth at the cardiac event monitor clinic for close recheck.  Return to the ER if worsening symptoms.

## 2023-12-22 NOTE — TELEPHONE ENCOUNTER
Caller: Artur Dias Jr.    Relationship to patient: Self    Best call back number: [phone]    New or established patient?  [] New  [x] Established    Date of discharge: 12.22.23    Facility discharged from: [facility]    Diagnosis/Symptoms: CHEST PAIN    Length of stay (If applicable): A FEW HOURS    Specialty Only: Did you see a [facility] health provider?    [] Yes  [x] No  If so, who?       PT NEEDS A NOTE FOR WORK PURPOSES.

## 2023-12-28 ENCOUNTER — OFFICE VISIT (OUTPATIENT)
Dept: CARDIOLOGY | Facility: HOSPITAL | Age: 23
End: 2023-12-28
Payer: COMMERCIAL

## 2023-12-28 ENCOUNTER — HOSPITAL ENCOUNTER (OUTPATIENT)
Dept: CARDIOLOGY | Facility: HOSPITAL | Age: 23
Discharge: HOME | End: 2023-12-28
Payer: COMMERCIAL

## 2023-12-28 VITALS
BODY MASS INDEX: 27.22 KG/M2 | HEART RATE: 87 BPM | OXYGEN SATURATION: 97 % | DIASTOLIC BLOOD PRESSURE: 66 MMHG | WEIGHT: 205.4 LBS | RESPIRATION RATE: 20 BRPM | HEIGHT: 73 IN | TEMPERATURE: 97.3 F | SYSTOLIC BLOOD PRESSURE: 124 MMHG

## 2023-12-28 DIAGNOSIS — R00.2 PALPITATIONS: ICD-10-CM

## 2023-12-28 DIAGNOSIS — R00.2 PALPITATIONS: Primary | ICD-10-CM

## 2023-12-28 DIAGNOSIS — R42 LIGHTHEADEDNESS: ICD-10-CM

## 2023-12-28 PROCEDURE — 93242 EXT ECG>48HR<7D RECORDING: CPT

## 2023-12-28 NOTE — PROGRESS NOTES
[facility] Heart Monitor Documentation    Artur Dias Jr.  2000  [id number]  12/28/23      [] ZIO XT Patch  Model [id number] Prescribed for  Days    Serial Number: (N + 9 Digits) N   Apply-By Date on Box:   USPS Tracking Number:   USPS Tracking        [] [employer] MINI PLUS Mobile Cardiac Telemetry  Model BGMINIPLUS Prescribed for  Days    Serial Number: (BGM + 7 Digits) BGM  Shipped-By Date on Box:   UPS Tracking Number: [id number]  UPS Tracking      [x] [employer] MINI Holter Monitor  Model BGMINIEL Prescribed for 14 Days    Serial Number: (7 Digits) [id number]  Shipped-By Date on Box: 12/18/2023  UPS Tracking Number: [id number]  UPS Tracking        This monitor was applied to the patient's chest and checked for proper functioning.  Mr. Artur Dias Jr. was instructed in the proper use of this monitor.  He was given the opportunity to ask questions and left the office with the device 's instruction manual.    Rema Escobedo CMA, 10:58 EST, 12/28/23                  [facility]

## 2023-12-28 NOTE — PROGRESS NOTES
"Chief Complaint  Palpitations and Dizziness    Subjective      History of Present Illness {CC  Problem List  Visit  Diagnosis   Encounters  Notes  Medications  Labs  Result Review Imaging  Media :23}     Artur Dias Jr., 23 y.o. male with palpitations presents to [facility] and Valve clinic for Palpitations and Dizziness.    Patient was recently evaluated at [facility] emergency department for complaints of palpitations.  Emergency department work-up revealed normal troponin/BNP, CXR with no acute cardiopulmonary findings, and ECGs with no evidence of acute coronary syndrome. Patient was instructed to follow-up at [facility] heart and valve clinic for further evaluation.    Patient states that palpitations started approximately 2 weeks ago.  Describes symptoms as harder, rapid heartbeat. Heart rate up to 190 on pulse oximeter. Palpitations at rest and with activity, lying in bed during high heart rate of 190. Lightheadedness but no syncope. Recently stopped adderall approximately 2 weeks ago. Previous cardiac workup for symptoms include: TTE, stress test, holter monitor in the past 9 months. Caffeine use: increased use after stopping adderrall, alcohol use: rare. Stays well hydrated with water.       Objective     Vital Signs:   Vitals:    12/28/23 1023 12/28/23 1024 12/28/23 1025   BP: 133/69 131/66 124/66   BP Location: Right arm Left arm Left arm   Patient Position: Sitting Standing Sitting   Cuff Size: Adult Adult Adult   Pulse: 90 116 87   Resp:   20   Temp: 97.3 °F (36.3 °C) 97.3 °F (36.3 °C) 97.3 °F (36.3 °C)   TempSrc: Temporal Temporal Temporal   SpO2: 96% 98% 97%   Weight: 93.3 kg (205 lb 9.6 oz)  93.2 kg (205 lb 6.4 oz)   Height:   185.4 cm (73\")     Body mass index is 27.1 kg/m².  Physical Exam  Vitals and nursing note reviewed.   Constitutional:       Appearance: Normal appearance.   HENT:      Head: Normocephalic.   Eyes:      Extraocular Movements: " Extraocular movements intact.   Neck:      Vascular: No carotid bruit.   Cardiovascular:      Rate and Rhythm: Normal rate and regular rhythm.      Pulses: Normal pulses.      Heart sounds: Normal heart sounds, S1 normal and S2 normal. No murmur heard.  Pulmonary:      Effort: Pulmonary effort is normal. No respiratory distress.      Breath sounds: Normal breath sounds.   Musculoskeletal:      Cervical back: Neck supple.      Right lower leg: No edema.      Left lower leg: No edema.   Skin:     General: Skin is warm and dry.   Neurological:      General: No focal deficit present.      Mental Status: He is alert.   Psychiatric:         Mood and Affect: Mood normal.         Behavior: Behavior normal.         Thought Content: Thought content normal.        Data Reviewed:{ Labs  Result Review  Imaging  Med Tab  Media :23}     ECG 12 Lead ED Triage Standing Order; Chest Pain (12/21/2023 20:28)  ECG 12 Lead ED Triage Standing Order; Chest Pain (12/21/2023 22:45)  High Sensitivity Troponin T 2Hr (12/21/2023 22:47)  Urine Drug Screen - Urine, Clean Catch (12/21/2023 21:12)  TSH (12/21/2023 20:28)  T4, Free (12/21/2023 20:28)  BNP (12/21/2023 20:28)  Comprehensive Metabolic Panel (12/21/2023 20:28)  CBC & Differential (12/21/2023 20:28)  High Sensitivity Troponin T (12/21/2023 20:28)  Adult Transthoracic Echo Complete w/ Color, Spectral and Contrast if necessary per protocol (05/01/2023 08:54)  Treadmill Stress Test (05/01/2023 08:11)  Holter Monitor - 48 Hour (04/05/2023 09:23)      Assessment & Plan   Assessment and Plan {CC Problem List  Visit Diagnosis  ROS  Review (Popup)  Health Maintenance  Quality  BestPractice  Medications  SmartSets  SnapShot Encounters  Media :23}     1. Palpitations  -New onset palpitations; denies syncope  -In setting of recent adderall discontinuation.  -Recent TTE 5/2023 with normal LVEF, no significant valvular abnormality.   - Holter Monitor - 14 Days; Future  -Discussed  caffeine reduction, working with prescribing provider on psychiatric medications    2. Lightheadedness  -Recent TTE 5/2023 with normal LVEF, no significant valvular abnormality.   -Holter Monitor - 14 Days; Future  -Discussed adequate hydration      Follow Up {Instructions Charge Capture  Follow-up Communications :23}     Return in about 1 month (around 1/28/2024) for Video visit, Monitor results, Recheck.      Patient was given instructions and counseling regarding his condition or for health maintenance advice. Please see specific information pulled into the AVS if appropriate.  Patient was instructed to call the Heart and Valve Center with any questions, concerns, or worsening symptoms.    Dictated Utilizing Dragon Dictation   Please note that portions of this note were completed with a voice recognition program.   Part of this note may be an electronic transcription/translation of spoken language to printed text using the Dragon Dictation System.

## 2024-03-12 ENCOUNTER — TELEPHONE (OUTPATIENT)
Dept: CARDIOLOGY | Facility: HOSPITAL | Age: 24
End: 2024-03-12
Payer: COMMERCIAL

## 2024-03-13 NOTE — TELEPHONE ENCOUNTER
Jose Alfredo Bearden, SHAVONNE  You2 hours ago (9:52 AM)         A relatively benign monitor study. No abnormal rhythms were seen. Elevated heat rate was seen occasionally, but normal rhythm during tachycardia. Average HR 92bpm.    Would recommend continued caffeine reduction and staying off adderall. It looks like he missed his appointment last Friday. Can reschedule for a telemed appointment this Friday for detailed discussion if he would like.    Thanks

## 2024-03-15 ENCOUNTER — TELEMEDICINE (OUTPATIENT)
Dept: CARDIOLOGY | Facility: HOSPITAL | Age: 24
End: 2024-03-15
Payer: COMMERCIAL

## 2024-03-15 DIAGNOSIS — R07.89 CHEST PAIN, MUSCULOSKELETAL: Primary | ICD-10-CM

## 2024-03-15 DIAGNOSIS — R00.2 PALPITATIONS: ICD-10-CM

## 2024-03-15 NOTE — PROGRESS NOTES
Mode of visit: Video  Do you consent to the use of telemedicine in your medical care today? Yes      Chief Complaint  Chest Pain and Palpitations (Follow-up)    [facility]  Heart and Valve Center  Telemedicine note    This was a video enabled telemedicine encounter.    Subjective    History of Present Illness {CC  Problem List  Visit  Diagnosis   Encounters  Notes  Medications  Labs  Result Review Imaging  Media :23}     Artur Dias Jr., 23 y.o. male with chest pain and palpitation presents to [facility] and Valve telemedicine visit for Chest Pain and Palpitations (Follow-up)    Since previous evaluation patient completed holter monitor that was reported as a relatively benign monitor study.  No arrhythmias noted.  Occasional sinus tachycardia.  Low burden PAC/PVC.    Presents today with improvement in chest pain symptoms. Stable palpitation symptoms. Palpitations generally at rest, no increased symptoms with activity with strenuous/active job.  Does report he was taking Adderall during cardiac monitor.  Reports he recently decreased caffeine and stopped smoking.       Objective     Vital Signs:   There were no vitals filed for this visit.  There is no height or weight on file to calculate BMI.  Physical Exam  HENT:      Head: Normocephalic.   Pulmonary:      Effort: Pulmonary effort is normal. No respiratory distress.   Neurological:      Mental Status: He is alert and oriented to person, place, and time.   Psychiatric:         Mood and Affect: Mood normal.         Behavior: Behavior normal.         Thought Content: Thought content normal.        Data Reviewed:{ Labs  Result Review  Imaging  Med Tab  Media :23}     Holter Monitor - 72 Hour Up To 15 Days (12/28/2023 11:11)   ECG 12 Lead ED Triage Standing Order; Chest Pain (12/21/2023 20:28)  ECG 12 Lead ED Triage Standing Order; Chest Pain (12/21/2023 22:45)  High Sensitivity Troponin T 2Hr (12/21/2023 22:47)  Urine Drug  Screen - Urine, Clean Catch (12/21/2023 21:12)  TSH (12/21/2023 20:28)  T4, Free (12/21/2023 20:28)  BNP (12/21/2023 20:28)  Comprehensive Metabolic Panel (12/21/2023 20:28)  CBC & Differential (12/21/2023 20:28)  High Sensitivity Troponin T (12/21/2023 20:28)  Adult Transthoracic Echo Complete w/ Color, Spectral and Contrast if necessary per protocol (05/01/2023 08:54)  Treadmill Stress Test (05/01/2023 08:11)  Holter Monitor - 48 Hour (04/05/2023 09:23)    Assessment and Plan {CC Problem List  Visit Diagnosis  ROS  Review (Popup)  Health Maintenance  Quality  BestPractice  Medications  SmartSets  SnapShot Encounters  Media :23}     This visit has been scheduled as a video visit.     1. Palpitations  -Stable palpitation symptoms consistent with monitor; denies near-syncope/syncope  -In setting of recent adderall discontinuation.  -Recent TTE 5/2023 with normal LVEF, no significant valvular abnormality.   - Holter Monitor with no significant arrhythmia, low burden PAC/PVC.  Sinus tachycardia noted during monitoring, Adderall use while monitors in place.  -Discussed continued caffeine reduction, Adderall side effects.     2. Chest pain, non-cardiac  -Recent TTE 5/2023 with normal LVEF, no significant valvular abnormality.  -Previous symptoms with abrupt Adderall discontinuation  -No exertional symptoms/anginal symptoms  -Prior treadmill stress test May 2023 with no diagnostic ischemic EKG changes, low risk study.  -Discussed potential course of NSAIDs if recurrent   -routine follow-up with PCP      Follow Up {Instructions Charge Capture  Follow-up Communications :23}   Return if symptoms worsen or fail to improve.        Patient was given instructions and counseling regarding his condition or for health maintenance advice. Please see specific information pulled into the AVS if appropriate.  Patient was instructed to call the [facility] Center with any questions, concerns, or worsening  symptoms.    *Please note that portions of this note were completed with a voice recognition program. Efforts were made to edit the dictations, but occasionally words are mistranscribed.

## 2024-04-30 ENCOUNTER — OFFICE VISIT (OUTPATIENT)
Dept: INTERNAL MEDICINE | Facility: CLINIC | Age: 24
End: 2024-04-30
Payer: COMMERCIAL

## 2024-04-30 VITALS
BODY MASS INDEX: 27.73 KG/M2 | RESPIRATION RATE: 16 BRPM | TEMPERATURE: 97.7 F | SYSTOLIC BLOOD PRESSURE: 110 MMHG | WEIGHT: 209.2 LBS | DIASTOLIC BLOOD PRESSURE: 64 MMHG | HEIGHT: 73 IN | HEART RATE: 62 BPM | OXYGEN SATURATION: 99 %

## 2024-04-30 DIAGNOSIS — M50.30 BULGE OF CERVICAL DISC WITHOUT MYELOPATHY: ICD-10-CM

## 2024-04-30 DIAGNOSIS — M47.9 SPONDYLOSIS: ICD-10-CM

## 2024-04-30 DIAGNOSIS — M54.2 CERVICAL SPINE PAIN: ICD-10-CM

## 2024-04-30 DIAGNOSIS — R68.84 JAW PAIN: Primary | ICD-10-CM

## 2024-04-30 DIAGNOSIS — H60.312 ACUTE DIFFUSE OTITIS EXTERNA OF LEFT EAR: ICD-10-CM

## 2024-04-30 PROCEDURE — 99213 OFFICE O/P EST LOW 20 MIN: CPT | Performed by: NURSE PRACTITIONER

## 2024-04-30 RX ORDER — ALBUTEROL SULFATE 90 UG/1
2 AEROSOL, METERED RESPIRATORY (INHALATION) EVERY 4 HOURS PRN
COMMUNITY
Start: 2024-01-05

## 2024-04-30 RX ORDER — NAPROXEN 500 MG/1
500 TABLET ORAL AS NEEDED
Qty: 30 TABLET | Refills: 1 | Status: SHIPPED | OUTPATIENT
Start: 2024-04-30

## 2024-04-30 RX ORDER — NEOMYCIN SULFATE, POLYMYXIN B SULFATE, HYDROCORTISONE 3.5; 10000; 1 MG/ML; [USP'U]/ML; MG/ML
4 SOLUTION/ DROPS AURICULAR (OTIC) 4 TIMES DAILY
Qty: 10 ML | Refills: 0 | Status: SHIPPED | OUTPATIENT
Start: 2024-04-30 | End: 2024-05-10

## 2024-04-30 RX ORDER — DEXTROAMPHETAMINE SACCHARATE, AMPHETAMINE ASPARTATE MONOHYDRATE, DEXTROAMPHETAMINE SULFATE AND AMPHETAMINE SULFATE 6.25; 6.25; 6.25; 6.25 MG/1; MG/1; MG/1; MG/1
25 CAPSULE, EXTENDED RELEASE ORAL EVERY MORNING
COMMUNITY
Start: 2024-04-16
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
## 2024-07-13 ENCOUNTER — HOSPITAL ENCOUNTER (EMERGENCY)
Facility: HOSPITAL | Age: 24
Discharge: HOME | End: 2024-07-13
Attending: FAMILY MEDICINE
Payer: COMMERCIAL

## 2024-07-13 VITALS
RESPIRATION RATE: 16 BRPM | DIASTOLIC BLOOD PRESSURE: 78 MMHG | WEIGHT: 215 LBS | HEART RATE: 77 BPM | SYSTOLIC BLOOD PRESSURE: 117 MMHG | HEIGHT: 73 IN | OXYGEN SATURATION: 100 % | BODY MASS INDEX: 28.49 KG/M2 | TEMPERATURE: 98 F

## 2024-07-13 DIAGNOSIS — B34.9 VIRAL SYNDROME: Primary | ICD-10-CM

## 2024-07-13 DIAGNOSIS — R31.29 MICROSCOPIC HEMATURIA: ICD-10-CM

## 2024-07-13 LAB
ALBUMIN SERPL-MCNC: 4.2 G/DL (ref 3.5–5.2)
ALBUMIN/GLOB SERPL: 2.1 G/DL
ALP SERPL-CCNC: 56 U/L (ref 39–117)
ALT SERPL W P-5'-P-CCNC: 23 U/L (ref 1–41)
AMORPH URATE CRY URNS QL MICRO: NORMAL /HPF
ANION GAP SERPL CALCULATED.3IONS-SCNC: 9.2 MMOL/L (ref 5–15)
AST SERPL-CCNC: 30 U/L (ref 1–40)
BACTERIA UR QL AUTO: NORMAL /HPF
BASOPHILS # BLD AUTO: 0.04 10*3/MM3 (ref 0–0.2)
BASOPHILS NFR BLD AUTO: 0.6 % (ref 0–1.5)
BILIRUB SERPL-MCNC: 0.5 MG/DL (ref 0–1.2)
BILIRUB UR QL STRIP: NEGATIVE
BUN SERPL-MCNC: 13 MG/DL (ref 6–20)
BUN/CREAT SERPL: 16.7 (ref 7–25)
CALCIUM SPEC-SCNC: 8.9 MG/DL (ref 8.6–10.5)
CHLORIDE SERPL-SCNC: 105 MMOL/L (ref 98–107)
CK SERPL-CCNC: 200 U/L (ref 20–200)
CLARITY UR: CLEAR
CO2 SERPL-SCNC: 26.8 MMOL/L (ref 22–29)
COLOR UR: YELLOW
CREAT SERPL-MCNC: 0.78 MG/DL (ref 0.76–1.27)
DEPRECATED RDW RBC AUTO: 37.7 FL (ref 37–54)
EGFRCR SERPLBLD CKD-EPI 2021: 126.9 ML/MIN/1.73
EOSINOPHIL # BLD AUTO: 0.18 10*3/MM3 (ref 0–0.4)
EOSINOPHIL NFR BLD AUTO: 2.6 % (ref 0.3–6.2)
ERYTHROCYTE [DISTWIDTH] IN BLOOD BY AUTOMATED COUNT: 11.8 % (ref 12.3–15.4)
FLUAV RNA RESP QL NAA+PROBE: NOT DETECTED
FLUBV RNA RESP QL NAA+PROBE: NOT DETECTED
GLOBULIN UR ELPH-MCNC: 2 GM/DL
GLUCOSE SERPL-MCNC: 101 MG/DL (ref 65–99)
GLUCOSE UR STRIP-MCNC: NEGATIVE MG/DL
HCT VFR BLD AUTO: 40.8 % (ref 37.5–51)
HETEROPH AB SER QL LA: NEGATIVE
HGB BLD-MCNC: 14 G/DL (ref 13–17.7)
HGB UR QL STRIP.AUTO: ABNORMAL
HYALINE CASTS UR QL AUTO: NORMAL /LPF
IMM GRANULOCYTES # BLD AUTO: 0.01 10*3/MM3 (ref 0–0.05)
IMM GRANULOCYTES NFR BLD AUTO: 0.1 % (ref 0–0.5)
KETONES UR QL STRIP: NEGATIVE
LEUKOCYTE ESTERASE UR QL STRIP.AUTO: NEGATIVE
LYMPHOCYTES # BLD AUTO: 2.01 10*3/MM3 (ref 0.7–3.1)
LYMPHOCYTES NFR BLD AUTO: 29.2 % (ref 19.6–45.3)
MAGNESIUM SERPL-MCNC: 2.1 MG/DL (ref 1.6–2.6)
MCH RBC QN AUTO: 29.5 PG (ref 26.6–33)
MCHC RBC AUTO-ENTMCNC: 34.3 G/DL (ref 31.5–35.7)
MCV RBC AUTO: 85.9 FL (ref 79–97)
MONOCYTES # BLD AUTO: 0.7 10*3/MM3 (ref 0.1–0.9)
MONOCYTES NFR BLD AUTO: 10.2 % (ref 5–12)
NEUTROPHILS NFR BLD AUTO: 3.94 10*3/MM3 (ref 1.7–7)
NEUTROPHILS NFR BLD AUTO: 57.3 % (ref 42.7–76)
NITRITE UR QL STRIP: NEGATIVE
PH UR STRIP.AUTO: 7.5 [PH] (ref 5–8)
PLATELET # BLD AUTO: 301 10*3/MM3 (ref 140–450)
PMV BLD AUTO: 10.1 FL (ref 6–12)
POTASSIUM SERPL-SCNC: 4 MMOL/L (ref 3.5–5.2)
PROT SERPL-MCNC: 6.2 G/DL (ref 6–8.5)
PROT UR QL STRIP: NEGATIVE
RBC # BLD AUTO: 4.75 10*6/MM3 (ref 4.14–5.8)
RBC # UR STRIP: NORMAL /HPF
REF LAB TEST METHOD: NORMAL
RSV RNA RESP QL NAA+PROBE: NOT DETECTED
S PYO AG THROAT QL: NEGATIVE
SARS-COV-2 RNA RESP QL NAA+PROBE: NOT DETECTED
SODIUM SERPL-SCNC: 141 MMOL/L (ref 136–145)
SP GR UR STRIP: 1.02 (ref 1–1.03)
SQUAMOUS #/AREA URNS HPF: NORMAL /HPF
TSH SERPL DL<=0.05 MIU/L-ACNC: 1.25 UIU/ML (ref 0.27–4.2)
UROBILINOGEN UR QL STRIP: ABNORMAL
WBC # UR STRIP: NORMAL /HPF
WBC NRBC COR # BLD AUTO: 6.88 10*3/MM3 (ref 3.4–10.8)

## 2024-07-13 PROCEDURE — 87880 STREP A ASSAY W/OPTIC: CPT | Performed by: PHYSICIAN ASSISTANT

## 2024-07-13 PROCEDURE — 80050 GENERAL HEALTH PANEL: CPT | Performed by: PHYSICIAN ASSISTANT

## 2024-07-13 PROCEDURE — 87081 CULTURE SCREEN ONLY: CPT | Performed by: PHYSICIAN ASSISTANT

## 2024-07-13 PROCEDURE — 25810000003 LACTATED RINGERS SOLUTION: Performed by: PHYSICIAN ASSISTANT

## 2024-07-13 PROCEDURE — 93005 ELECTROCARDIOGRAM TRACING: CPT | Performed by: PHYSICIAN ASSISTANT

## 2024-07-13 PROCEDURE — 87637 SARSCOV2&INF A&B&RSV AMP PRB: CPT | Performed by: PHYSICIAN ASSISTANT

## 2024-07-13 PROCEDURE — 99283 EMERGENCY DEPT VISIT LOW MDM: CPT

## 2024-07-13 PROCEDURE — 83735 ASSAY OF MAGNESIUM: CPT | Performed by: PHYSICIAN ASSISTANT

## 2024-07-13 PROCEDURE — 82550 ASSAY OF CK (CPK): CPT | Performed by: PHYSICIAN ASSISTANT

## 2024-07-13 PROCEDURE — 86308 HETEROPHILE ANTIBODY SCREEN: CPT | Performed by: PHYSICIAN ASSISTANT

## 2024-07-13 PROCEDURE — 81001 URINALYSIS AUTO W/SCOPE: CPT | Performed by: PHYSICIAN ASSISTANT

## 2024-07-13 RX ORDER — NAPROXEN 500 MG/1
500 TABLET ORAL 2 TIMES DAILY PRN
Qty: 60 TABLET | Refills: 0 | Status: SHIPPED | OUTPATIENT
Start: 2024-07-13

## 2024-07-13 RX ORDER — SODIUM CHLORIDE 0.9 % (FLUSH) 0.9 %
10 SYRINGE (ML) INJECTION AS NEEDED
Status: DISCONTINUED | OUTPATIENT
Start: 2024-07-13 | End: 2024-07-13 | Stop reason: HOSPADM

## 2024-07-13 RX ADMIN — SODIUM CHLORIDE, POTASSIUM CHLORIDE, SODIUM LACTATE AND CALCIUM CHLORIDE 1000 ML: 600; 310; 30; 20 INJECTION, SOLUTION INTRAVENOUS at 14:34

## 2024-07-13 NOTE — DISCHARGE INSTRUCTIONS
Follow-up with PCP for recheck of symptoms.  Return the emergency department for new, worsening, concerning symptoms.

## 2024-07-13 NOTE — FSED PROVIDER NOTE
Subjective  History of Present Illness:    Patient is a 23-year-old male with medical history of ADHD presents to the emergency department for evaluation of generalized malaise for the past 3 days.  Patient states he has also had intermittent palpitations and intermittent shooting pain in bilateral lower extremities.  Patient does not have the symptoms present on arrival to ED.  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  Patient denies any urinary symptoms otherwise.  Patient denies fever, cough, congestion, known sick contacts, pharyngitis.  Patient took Tylenol and naproxen.      Nurses Notes reviewed and agree, including vitals, allergies, social history and prior medical history.     REVIEW OF SYSTEMS: All systems reviewed and not pertinent unless noted.  Review of Systems   Constitutional:  Positive for chills and fatigue. Negative for fever.   HENT:  Negative for ear pain, sore throat and trouble swallowing.    Eyes:  Negative for visual disturbance.   Respiratory:  Negative for cough and shortness of breath.    Cardiovascular:  Positive for palpitations. Negative for chest pain and leg swelling.   Gastrointestinal:  Negative for abdominal distention, abdominal pain, nausea and vomiting.   Endocrine: Negative for cold intolerance and heat intolerance.   Genitourinary:  Positive for hematuria. Negative for dysuria, flank pain, frequency and urgency.   Musculoskeletal:  Negative for back pain, gait problem and neck pain.        Intermittent bilateral leg pain   Skin:  Negative for rash.   Allergic/Immunologic: Negative for immunocompromised state.   Neurological:  Negative for dizziness, syncope, light-headedness and numbness.   Hematological:  Does not bruise/bleed easily.   Psychiatric/Behavioral:  Negative for hallucinations. The patient is not nervous/anxious.    All other systems reviewed and are negative.      Past Medical History:   Diagnosis Date    ADHD  (attention deficit hyperactivity disorder)     Dr. Chong Tellez    Anxiety 2013    Treated as a teenager until i became an adult and stopped gettubg seen    Arthritis     Bipolar 1 disorder     Carpal tunnel syndrome of right wrist 2023    DDD (degenerative disc disease), lumbar     Depression 2013    Headache 2016    History of medical problems     Hsv-2, Degeneration Disc Disease, Irregular Heartbeat, Removed Gall Bladder    HSV (herpes simplex virus) anogenital infection     Low back pain        Allergies:    Bee venom and Doxycycline      Past Surgical History:   Procedure Laterality Date    CHOLECYSTECTOMY  2019    COLONOSCOPY           Social History     Socioeconomic History    Marital status: Single   Tobacco Use    Smoking status: Some Days     Current packs/day: 0.00     Average packs/day: 0.5 packs/day for 10.8 years (5.4 ttl pk-yrs)     Types: Cigarettes     Start date: 2012     Last attempt to quit: 2023     Years since quittin.9    Smokeless tobacco: Never    Tobacco comments:     Trying to quit by using ZYN   Vaping Use    Vaping status: Former    Substances: Nicotine    Devices: Disposable, Pre-filled or refillable cartridge, Pre-filled pod   Substance and Sexual Activity    Alcohol use: Yes     Comment: Social drinker like once a month maybe 4 beers    Drug use: Never    Sexual activity: Yes     Partners: Female         Family History   Problem Relation Age of Onset    Liver disease Mother     Mental illness Mother     Alcohol abuse Mother     Anxiety disorder Mother     Cancer Mother         Ovarian cancer while pregnant with me    No Known Problems Father     No Known Problems Sister     No Known Problems Sister     No Known Problems Brother     No Known Problems Maternal Grandmother     Heart failure Maternal Grandfather     Heart attack Maternal Grandfather     Diabetes Maternal Grandfather         Congestive Heart Failure       Objective  Physical Exam:  /68    "Pulse 68   Temp 98 °F (36.7 °C) (Oral)   Resp 16   Ht 185.4 cm (73\")   Wt 97.5 kg (215 lb)   SpO2 100%   BMI 28.37 kg/m²      Physical Exam  Vitals and nursing note reviewed.   Constitutional:       General: He is not in acute distress.     Appearance: He is not toxic-appearing.   HENT:      Head: Normocephalic and atraumatic.      Nose: Nose normal.      Mouth/Throat:      Mouth: Mucous membranes are moist.   Eyes:      Extraocular Movements: Extraocular movements intact.      Conjunctiva/sclera: Conjunctivae normal.      Pupils: Pupils are equal, round, and reactive to light.   Cardiovascular:      Rate and Rhythm: Normal rate and regular rhythm.      Pulses: Normal pulses.      Heart sounds: Normal heart sounds. No murmur heard.  Pulmonary:      Effort: Pulmonary effort is normal.      Breath sounds: Normal breath sounds. No stridor. No wheezing or rhonchi.   Abdominal:      General: Bowel sounds are normal. There is no distension.      Palpations: Abdomen is soft.      Tenderness: There is no abdominal tenderness. There is no guarding or rebound.   Musculoskeletal:         General: No deformity. Normal range of motion.      Cervical back: Normal range of motion and neck supple.      Right lower leg: No edema.      Left lower leg: No edema.   Skin:     General: Skin is warm and dry.      Capillary Refill: Capillary refill takes less than 2 seconds.      Findings: No erythema or rash.   Neurological:      General: No focal deficit present.      Mental Status: He is alert and oriented to person, place, and time.      Cranial Nerves: No cranial nerve deficit.      Sensory: No sensory deficit.      Coordination: Coordination normal.      Gait: Gait normal.   Psychiatric:         Mood and Affect: Mood normal.         Behavior: Behavior normal.         Procedures    ED Course:    ED Course as of 07/13/24 1521   Sat Jul 13, 2024   1510 EKG is normal sinus rhythm with a rate of 65 bpm AR interval is 146 ms QRS " duration is 90 ms QT/QTc is 380/395 ms [EG]      ED Course User Index  [EG] Blanka Rowell DO       Lab Results (last 24 hours)       Procedure Component Value Units Date/Time    COVID PRE-OP / PRE-PROCEDURE SCREENING ORDER (NO ISOLATION) - Swab, Nasopharynx [[id number]]  (Normal) Collected: 07/13/24 1415    Specimen: Swab from Nasopharynx Updated: 07/13/24 1501    Narrative:      The following orders were created for panel order COVID PRE-OP / PRE-PROCEDURE SCREENING ORDER (NO ISOLATION) - Swab, Nasopharynx.  Procedure                               Abnormality         Status                     ---------                               -----------         ------                     COVID-19, FLU A/B, RSV P...[[id number]]  Normal              Final result                 Please view results for these tests on the individual orders.    Rapid Strep A Screen - Swab, Throat [[id number]]  (Normal) Collected: 07/13/24 1415    Specimen: Swab from Throat Updated: 07/13/24 1438     Strep A Ag Negative    COVID-19, FLU A/B, RSV PCR 1 HR TAT - Swab, Nasopharynx [[id number]]  (Normal) Collected: 07/13/24 1415    Specimen: Swab from Nasopharynx Updated: 07/13/24 1501     COVID19 Not Detected     Influenza A PCR Not Detected     Influenza B PCR Not Detected     RSV, PCR Not Detected    Narrative:      Fact sheet for providers: https://www.fda.gov/media/347669/download    Fact sheet for patients: https://www.fda.gov/media/811208/download    Test performed by PCR.    Beta Strep Culture, Throat - Swab, Throat [[id number]] Collected: 07/13/24 1415    Specimen: Swab from Throat Updated: 07/13/24 1438    Mononucleosis Screen [[id number]]  (Normal) Collected: 07/13/24 1431    Specimen: Blood Updated: 07/13/24 1455     Monospot Negative    Magnesium [[id number]]  (Normal) Collected: 07/13/24 1431    Specimen: Blood Updated: 07/13/24 1456     Magnesium 2.1 mg/dL     TSH [[id number]]  (Normal) Collected: 07/13/24 1431    Specimen: Blood Updated:  07/13/24 1501     TSH 1.250 uIU/mL     CBC & Differential [[id number]]  (Abnormal) Collected: 07/13/24 1431    Specimen: Blood Updated: 07/13/24 1437    Narrative:      The following orders were created for panel order CBC & Differential.  Procedure                               Abnormality         Status                     ---------                               -----------         ------                     CBC Auto Differential[[id number]]        Abnormal            Final result                 Please view results for these tests on the individual orders.    Comprehensive Metabolic Panel [[id number]]  (Abnormal) Collected: 07/13/24 1431    Specimen: Blood Updated: 07/13/24 1456     Glucose 101 mg/dL      BUN 13 mg/dL      Creatinine 0.78 mg/dL      Sodium 141 mmol/L      Potassium 4.0 mmol/L      Comment: Specimen hemolyzed.  Result may be falsely elevated.        Chloride 105 mmol/L      CO2 26.8 mmol/L      Calcium 8.9 mg/dL      Total Protein 6.2 g/dL      Albumin 4.2 g/dL      ALT (SGPT) 23 U/L      AST (SGOT) 30 U/L      Alkaline Phosphatase 56 U/L      Total Bilirubin 0.5 mg/dL      Globulin 2.0 gm/dL      A/G Ratio 2.1 g/dL      BUN/Creatinine Ratio 16.7     Anion Gap 9.2 mmol/L      eGFR 126.9 mL/min/1.73     Narrative:      GFR Normal >60  Chronic Kidney Disease <60  Kidney Failure <15      CK [[id number]]  (Normal) Collected: 07/13/24 1431    Specimen: Blood Updated: 07/13/24 1454     Creatine Kinase 200 U/L     CBC Auto Differential [[id number]]  (Abnormal) Collected: 07/13/24 1431    Specimen: Blood Updated: 07/13/24 1437     WBC 6.88 10*3/mm3      RBC 4.75 10*6/mm3      Hemoglobin 14.0 g/dL      Hematocrit 40.8 %      MCV 85.9 fL      MCH 29.5 pg      MCHC 34.3 g/dL      RDW 11.8 %      RDW-SD 37.7 fl      MPV 10.1 fL      Platelets 301 10*3/mm3      Neutrophil % 57.3 %      Lymphocyte % 29.2 %      Monocyte % 10.2 %      Eosinophil % 2.6 %      Basophil % 0.6 %      Immature Grans % 0.1 %       Neutrophils, Absolute 3.94 10*3/mm3      Lymphocytes, Absolute 2.01 10*3/mm3      Monocytes, Absolute 0.70 10*3/mm3      Eosinophils, Absolute 0.18 10*3/mm3      Basophils, Absolute 0.04 10*3/mm3      Immature Grans, Absolute 0.01 10*3/mm3     Urinalysis With Microscopic If Indicated (No Culture) - Urine, Clean Catch [[id number]]  (Abnormal) Collected: 07/13/24 1439    Specimen: Urine, Clean Catch Updated: 07/13/24 1451     Color, UA Yellow     Appearance, UA Clear     pH, UA 7.5     Specific Gravity, UA 1.020     Glucose, UA Negative     Ketones, UA Negative     Bilirubin, UA Negative     Blood, UA Small (1+)     Protein, UA Negative     Leuk Esterase, UA Negative     Nitrite, UA Negative     Urobilinogen, UA 0.2 E.U./dL    Urinalysis, Microscopic Only - Urine, Clean Catch [[id number]] Collected: 07/13/24 1439    Specimen: Urine, Clean Catch Updated: 07/13/24 1515     RBC, UA 0-2 /HPF      WBC, UA 0-2 /HPF      Bacteria, UA None Seen /HPF      Squamous Epithelial Cells, UA 0-2 /HPF      Hyaline Casts, UA None Seen /LPF      Amorphous Crystals, UA Moderate/2+ /HPF      Methodology Manual Light Microscopy             No radiology results from the last 24 hrs       MDM     Amount and/or Complexity of Data Reviewed  Clinical lab tests: reviewed  Tests in the medicine section of CPT®: reviewed        Initial impression of presenting illness: Patient is a 23-year-old male with medical history of ADHD presents emergency department for 3 days of generalized malaise, palpitations, intermittent shooting pain bilateral lower extremities.    Patient arrives afebrile, hematin stable, nontoxic-appearing with vitals interpreted by myself.     Pertinent features from physical exam: Asymptomatic, regular rhythm heart rate.    DDX: includes but is not limited to: Viral syndrome, arrhythmia, electrolyte derangement, dehydration, heat exhaustion, others    Initial diagnostic plan and interventions: Workup includes CBC, CMP, magnesium,  TSH, CK, urinalysis, EKG.  Interventions include IV fluids, Tylenol.    Diagnostic information from other sources: Previous encounters    Results from initial plan were reviewed and interpreted by me revealing no evidence of leukocytosis, significant anemia or electrolyte abnormality, urinalysis does show microscopic hematuria without evidence of UTI or other abnormality.  EKG shows normal sinus rhythm without significant ST segment changes or T wave abnormalities or evidence of arrhythmia.  CK is unremarkable.  Mono, strep, COVID-19 and influenza are all negative.    Re-evaluation: Patient continues hemodynamically stable and nontoxic-appearing.  Patient has benign physical exam.  At this time symptoms are likely due to viral syndrome.  Patient is instructed to follow-up with PCP for palpitations and microscopic hematuria.  Patient states he has been evaluated by cardiologist with stress test, echo, Holter for evaluation of palpitations in the past.  Patient is agreeable plan to discharge home.  Patient given return precautions to the emergency department.      Medications   sodium chloride 0.9 % flush 10 mL (has no administration in time range)   lactated ringers bolus 1,000 mL (1,000 mL Intravenous New Bag 7/13/24 1434)       Results/clinical rationale were discussed with patient        Data interpreted: Nursing notes reviewed, vital signs reviewed.  Labs independently interpreted by me.  Imaging independently interpreted by me.  EKG independently interpreted by me.     Counseling: Discussed the results above with the patient regarding need for admission or discharge.  Patient understands and agrees plan of care.      -----  ED Disposition       ED Disposition   Discharge    Condition   Stable    Comment   --             Final diagnoses:   Viral syndrome   Microscopic hematuria      Your Follow-Up Providers       Schedule an appointment as soon as possible for a visit  with Farzana Fraga APRN.     Specialty: Family Medicine  Follow up details: As needed, If symptoms worsen  [address]  [address]  [facility] [id number] [phone]                       Contact information for after-discharge care    Follow-up information has not been specified.                    Your medication list        CHANGE how you take these medications        Instructions Last Dose Given Next Dose Due   naproxen 500 MG tablet  Commonly known as: NAPROSYN  What changed: Another medication with the same name was added. Make sure you understand how and when to take each.      Take 1 tablet by mouth As Needed for Mild Pain.       naproxen 500 MG EC tablet  Commonly known as: EC NAPROSYN  What changed: You were already taking a medication with the same name, and this prescription was added. Make sure you understand how and when to take each.      Take 1 tablet by mouth 2 (Two) Times a Day As Needed for Mild Pain.              CONTINUE taking these medications        Instructions Last Dose Given Next Dose Due   albuterol sulfate  (90 Base) MCG/ACT inhaler  Commonly known as: PROVENTIL HFA;VENTOLIN HFA;PROAIR HFA      Inhale 2 puffs Every 4 (Four) Hours As Needed for Shortness of Air.       amphetamine-dextroamphetamine 20 MG tablet  Commonly known as: ADDERALL      Take 1 tablet by mouth Every 12 (Twelve) Hours.       amphetamine-dextroamphetamine XR 25 MG 24 hr capsule  Commonly known as: ADDERALL XR      Take 1 capsule by mouth Every Morning       fluticasone 50 MCG/ACT nasal spray  Commonly known as: FLONASE      2 sprays by Each Nare route Daily As Needed.                 Where to Get Your Medications        These medications were sent to [facility]/pharmacy #[id number] - [place], [place] - [address]  - [phone]  - [phone]   [address] [place], [facility] [address]      Hours: 24-hours Phone: [phone]   naproxen 500 MG EC tablet

## 2024-07-15 LAB
BACTERIA SPEC AEROBE CULT: NORMAL
QT INTERVAL: 380 MS
QTC INTERVAL: 395 MS

## 2024-07-22 ENCOUNTER — PATIENT ROUNDING (BHMG ONLY) (OUTPATIENT)
Dept: URGENT CARE | Facility: CLINIC | Age: 24
End: 2024-07-22
Payer: MEDICAID

## 2024-07-27 PROBLEM — B30.9 VIRAL CONJUNCTIVITIS OF BOTH EYES: Status: ACTIVE | Noted: 2024-07-27

## 2024-07-27 PROBLEM — U07.1 COVID-19: Status: ACTIVE | Noted: 2024-07-27

## 2024-07-27 PROBLEM — M79.10 MYALGIA: Status: ACTIVE | Noted: 2024-07-27

## 2024-07-28 ENCOUNTER — PATIENT ROUNDING (BHMG ONLY) (OUTPATIENT)
Dept: URGENT CARE | Facility: CLINIC | Age: 24
End: 2024-07-28

## 2024-10-11 ENCOUNTER — TELEPHONE (OUTPATIENT)
Dept: INTERNAL MEDICINE | Facility: CLINIC | Age: 24
End: 2024-10-11

## 2024-10-11 NOTE — TELEPHONE ENCOUNTER
Caller: Artur Dias Jr.    Relationship: Self    Best call back number:  [phone] (Mobile)   [phone]  PLEASE CALL PATIENT IF A MEDICATION CAN BE SENT IN       Who are you requesting to speak with (clinical staff, provider,  specific staff member): CLINICAL    What was the call regarding:  PATIENT WENT TO AN  HOSPITAL IN [facility]    THEY TREATED HIM FOR EPIDIDYMITIS     THEY DID AN URINALYSIS THAT HAD A TRACE OF BLOOD IN HIS URINE     THE MEDICATION THEY PRESCRIBED CIPROFLOXACIN IS MAKING HIM SICK AND HE QUIT TAKING IT     HE IS A  AND IS CURRENTLY IN [facility] AND IS REQUESTING ANOTHER MEDICATION   IF A PRESCRIPTION CAN BE PUT INTO HIS Taoism MY CHART   HE SAID HE COULD PRINT IT AND TAKE IT TO A PHARMACY

## 2024-10-14 NOTE — TELEPHONE ENCOUNTER
HUB to relay: Please find out if patient is still sick and needs medication and let him know I was out of the office I'm sorry I am just getting back to him

## 2024-10-14 NOTE — TELEPHONE ENCOUNTER
Name: Artur Dias Axel Stroud.    Relationship: Self    Best Callback Number: [phone]    HUB PROVIDED THE RELAY MESSAGE FROM THE OFFICE   PATIENT [phone]    ADDITIONAL INFORMATION: PATIENT IS STILL EXPERIENCING FATIGUE, HOT FLASHES, JOINT PAIN AND FREQUENT URINATION AND HEADACHES. PATIENT IS A  AND HIS NEXT STOP IS [place]. PLEASE ADVISE

## 2024-10-14 NOTE — TELEPHONE ENCOUNTER
Please find out if patient is still sick and needs medication and let him know I was out of the office I'm sorry I am just getting back to him